# Patient Record
Sex: FEMALE | Race: BLACK OR AFRICAN AMERICAN | Employment: FULL TIME | URBAN - METROPOLITAN AREA
[De-identification: names, ages, dates, MRNs, and addresses within clinical notes are randomized per-mention and may not be internally consistent; named-entity substitution may affect disease eponyms.]

---

## 2017-01-04 PROBLEM — Z87.898 HISTORY OF PREDIABETES: Status: ACTIVE | Noted: 2017-01-04

## 2017-01-04 PROBLEM — N80.9 ENDOMETRIOSIS: Status: ACTIVE | Noted: 2017-01-04

## 2017-03-24 PROBLEM — R10.10 PAIN OF UPPER ABDOMEN: Status: ACTIVE | Noted: 2017-03-24

## 2017-03-28 ENCOUNTER — HOSPITAL ENCOUNTER (OUTPATIENT)
Dept: ULTRASOUND IMAGING | Age: 37
Discharge: HOME | End: 2017-03-28
Attending: FAMILY MEDICINE
Payer: SELF-PAY

## 2017-03-28 DIAGNOSIS — R74.01 TRANSAMINITIS: ICD-10-CM

## 2017-03-28 PROBLEM — K76.0 HEPATIC STEATOSIS: Status: ACTIVE | Noted: 2017-03-28

## 2017-03-28 PROCEDURE — 76700 US EXAM ABDOM COMPLETE: CPT

## 2017-03-28 NOTE — PROGRESS NOTES
As suspected, she has a fatty liver. This can eventually lead to liver failure. The best way to avoid that is to cut back on alcohol. We will have to monitor her labs a few times a year going forward.

## 2017-09-20 ENCOUNTER — APPOINTMENT (OUTPATIENT)
Dept: GENERAL RADIOLOGY | Age: 37
End: 2017-09-20
Attending: EMERGENCY MEDICINE
Payer: SELF-PAY

## 2017-09-20 ENCOUNTER — HOSPITAL ENCOUNTER (EMERGENCY)
Age: 37
Discharge: HOME | End: 2017-09-20
Attending: EMERGENCY MEDICINE
Payer: SELF-PAY

## 2017-09-20 ENCOUNTER — APPOINTMENT (OUTPATIENT)
Dept: GENERAL RADIOLOGY | Age: 37
End: 2017-09-20
Payer: SELF-PAY

## 2017-09-20 VITALS
DIASTOLIC BLOOD PRESSURE: 88 MMHG | OXYGEN SATURATION: 99 % | WEIGHT: 275 LBS | TEMPERATURE: 97.3 F | RESPIRATION RATE: 17 BRPM | HEIGHT: 68 IN | SYSTOLIC BLOOD PRESSURE: 167 MMHG | BODY MASS INDEX: 41.68 KG/M2 | HEART RATE: 82 BPM

## 2017-09-20 DIAGNOSIS — S93.402A SPRAIN OF LEFT ANKLE, UNSPECIFIED LIGAMENT, INITIAL ENCOUNTER: Primary | ICD-10-CM

## 2017-09-20 PROCEDURE — 99284 EMERGENCY DEPT VISIT MOD MDM: CPT | Performed by: EMERGENCY MEDICINE

## 2017-09-20 PROCEDURE — 73610 X-RAY EXAM OF ANKLE: CPT

## 2017-09-20 PROCEDURE — 73590 X-RAY EXAM OF LOWER LEG: CPT

## 2017-09-20 RX ORDER — MELOXICAM 15 MG/1
15 TABLET ORAL DAILY
Qty: 10 TAB | Refills: 0 | Status: SHIPPED | OUTPATIENT
Start: 2017-09-20 | End: 2017-09-22

## 2017-09-20 RX ORDER — HYDROCODONE BITARTRATE AND ACETAMINOPHEN 5; 325 MG/1; MG/1
1 TABLET ORAL
Status: DISCONTINUED | OUTPATIENT
Start: 2017-09-20 | End: 2017-09-20 | Stop reason: HOSPADM

## 2017-09-20 NOTE — ED NOTES
I have reviewed discharge instructions with the patient. The patient verbalized understanding. Patient left ED via Discharge Method: ambulatory to Home with family. Opportunity for questions and clarification provided. Patient given 1 scripts.

## 2017-09-20 NOTE — DISCHARGE INSTRUCTIONS
Ankle Sprain: Care Instructions  Your Care Instructions    An ankle sprain can happen when you twist your ankle. The ligaments that support the ankle can get stretched and torn. Often the ankle is swollen and painful. Ankle sprains may take from several weeks to several months to heal. Usually, the more pain and swelling you have, the more severe your ankle sprain is and the longer it will take to heal. You can heal faster and regain strength in your ankle with good home treatment. It is very important to give your ankle time to heal completely, so that you do not easily hurt your ankle again. Follow-up care is a key part of your treatment and safety. Be sure to make and go to all appointments, and call your doctor if you are having problems. It's also a good idea to know your test results and keep a list of the medicines you take. How can you care for yourself at home? · Prop up your foot on pillows as much as possible for the next 3 days. Try to keep your ankle above the level of your heart. This will help reduce the swelling. · Follow your doctor's directions for wearing a splint or elastic bandage. Wrapping the ankle may help reduce or prevent swelling. · Your doctor may give you a splint, a brace, an air stirrup, or another form of ankle support to protect your ankle until it is healed. Wear it as directed while your ankle is healing. Do not remove it unless your doctor tells you to. After your ankle has healed, ask your doctor whether you should wear the brace when you exercise. · Put ice or cold packs on your injured ankle for 10 to 20 minutes at a time. Try to do this every 1 to 2 hours for the next 3 days (when you are awake) or until the swelling goes down. Put a thin cloth between the ice and your skin. · You may need to use crutches until you can walk without pain. If you do use crutches, try to bear some weight on your injured ankle if you can do so without pain.  This helps the ankle heal.  · Take pain medicines exactly as directed. ¨ If the doctor gave you a prescription medicine for pain, take it as prescribed. ¨ If you are not taking a prescription pain medicine, ask your doctor if you can take an over-the-counter medicine. · If you have been given ankle exercises to do at home, do them exactly as instructed. These can promote healing and help prevent lasting weakness. When should you call for help? Call your doctor now or seek immediate medical care if:  · Your pain is getting worse. · Your swelling is getting worse. · Your splint feels too tight or you are unable to loosen it. Watch closely for changes in your health, and be sure to contact your doctor if:  · You are not getting better after 1 week. Where can you learn more? Go to http://edel-john.info/. Enter L977 in the search box to learn more about \"Ankle Sprain: Care Instructions. \"  Current as of: March 21, 2017  Content Version: 11.3  © 9633-2046 [employer]. Care instructions adapted under license by [employer] (which disclaims liability or warranty for this information). If you have questions about a medical condition or this instruction, always ask your healthcare professional. [place] [address] any warranty or liability for your use of this information. Ankle Sprain: Rehab Exercises  Your Care Instructions  Here are some examples of typical rehabilitation exercises for your condition. Start each exercise slowly. Ease off the exercise if you start to have pain. Your doctor or physical therapist will tell you when you can start these exercises and which ones will work best for you. How to do the exercises  \"Alphabet\" exercise    1. Trace the alphabet with your toe. This helps your ankle move in all directions. Side-to-side knee swing exercise    1. Sit in a chair with your foot flat on the floor. 2. Slowly move your knee from side to side.  Keep your foot pressed flat. 3. Continue this exercise for 2 to 3 minutes. Towel curl    1. While sitting, place your foot on a towel on the floor. Scrunch the towel toward you with your toes. 2. Then use your toes to push the towel away from you. 3. To make this exercise more challenging you can put something on the other end of the towel. A can of soup is about the right weight for this. Towel stretch    1. Sit with your legs extended and knees straight. 2. Place a towel around your foot just under the toes. 3. Hold each end of the towel in each hand, with your hands above your knees. 4. Pull back with the towel so that your foot stretches toward you. 5. Hold the position for at least 15 to 30 seconds. 6. Repeat 2 to 4 times a session. Do up to 5 sessions a day. Ankle eversion exercise    1. Start by sitting with your foot flat on the floor. Push your foot outward against a wall or a piece of furniture that doesn't move. Hold for about 6 seconds, and relax. Repeat 8 to 12 times. 2. After you feel comfortable with this, try using rubber tubing looped around the outside of your feet for resistance. Push your foot out to the side against the tubing, and then count to 10 as you slowly bring your foot back to the middle. Repeat 8 to 12 times. Isometric opposition exercises    1. While sitting, put your feet together flat on the floor. 2. Press your injured foot inward against your other foot. Hold for about 6 seconds, and relax. Repeat 8 to 12 times. 3. Then place the heel of your other foot on top of the injured one. Push down with the top heel while trying to push up with your injured foot. Hold for about 6 seconds, and relax. Repeat 8 to 12 times. Resisted ankle inversion    1. Sit on the floor with your good leg crossed over your other leg. 2. Hold both ends of an exercise band and loop the band around the inside of your affected foot. Then press your other foot against the band.   3. Keeping your legs crossed, slowly push your affected foot against the band so that foot moves away from your other foot. Then slowly relax. 4. Repeat 8 to 12 times. Resisted ankle eversion    1. Sit on the floor with your legs straight. 2. Hold both ends of an exercise band and loop the band around the outside of your affected foot. Then press your other foot against the band. 3. Keeping your leg straight, slowly push your affected foot outward against the band and away from your other foot without letting your leg rotate. Then slowly relax. 4. Repeat 8 to 12 times. Resisted ankle dorsiflexion    1. Tie the ends of an exercise band together to form a loop. Attach one end of the loop to a secure object or shut a door on it to hold it in place. (Or you can have someone hold one end of the loop to provide resistance.)  2. While sitting on the floor or in a chair, loop the other end of the band over the top of your affected foot. 3. Keeping your knee and leg straight, slowly flex your foot to pull back on the exercise band, and then slowly relax. 4. Repeat 8 to 12 times. Single-leg balance    1. Stand on a flat surface with your arms stretched out to your sides like you are making the letter \"T. \" Then lift your good leg off the floor, bending it at the knee. If you are not steady on your feet, use one hand to hold on to a chair, counter, or wall. 2. Standing on the leg with your affected ankle, keep that knee straight. Try to balance on that leg for up to 30 seconds. Then rest for up to 10 seconds. 3. Repeat 6 to 8 times. 4. When you can balance on your affected leg for 30 seconds with your eyes open, try to balance on it with your eyes closed. When you can do this exercise with your eyes closed for 30 seconds and with ease and no pain, try standing on a pillow or piece of foam, and repeat steps 1 through 4. Follow-up care is a key part of your treatment and safety.  Be sure to make and go to all appointments, and call your doctor if you are having problems. It's also a good idea to know your test results and keep a list of the medicines you take. Where can you learn more? Go to http://edel-john.info/. Drake Salinas in the search box to learn more about \"Ankle Sprain: Rehab Exercises. \"  Current as of: March 21, 2017  Content Version: 11.3  © 1033-8167 [employer]. Care instructions adapted under license by [employer] (which disclaims liability or warranty for this information). If you have questions about a medical condition or this instruction, always ask your healthcare professional. [address] any warranty or liability for your use of this information.

## 2017-09-20 NOTE — ED PROVIDER NOTES
HPI Comments: Presents with complaints of left ankle pain  After slipping and falling. States she felt a pop. States it hurts all the way up her lateral leg. Patient is a 44 y.o. female presenting with ankle problem. The history is provided by the patient. Ankle Injury    This is a new problem. The current episode started 1 to 2 hours ago. The problem occurs constantly. The problem has not changed since onset. The pain is present in the left ankle and left lower leg. The quality of the pain is described as aching and pounding. The pain is moderate. Associated symptoms include stiffness. She has tried nothing for the symptoms. There has been a history of trauma. Past Medical History:   Diagnosis Date    Gastrointestinal disorder     reflux    GERD (gastroesophageal reflux disease)     Other ill-defined conditions(799.89)     ruptured back disc; endometriosis       Past Surgical History:   Procedure Laterality Date    HX COLONOSCOPY  2007    Normal - workup for anemia    HX HYSTERECTOMY      L ovary also taken out - was adhered to bladder 2007 2/2 endometriosis         Family History:   Problem Relation Age of Onset    Heart Disease Father     Diabetes Mother        Social History     Social History    Marital status: SINGLE     Spouse name: N/A    Number of children: N/A    Years of education: N/A     Occupational History    Not on file. Social History Main Topics    Smoking status: Never Smoker    Smokeless tobacco: Not on file    Alcohol use Yes      Comment: occ    Drug use: No    Sexual activity: Not Currently     Other Topics Concern    Not on file     Social History Narrative         ALLERGIES: Effexor [venlafaxine]    Review of Systems   Constitutional: Negative for chills and fever. Musculoskeletal: Positive for joint swelling and stiffness. Skin: Negative for rash and wound. All other systems reviewed and are negative.       Vitals:    09/20/17 1238 09/20/17 1624   BP: (!) 180/103 167/88   Pulse: 98 82   Resp: 18 17   Temp: 97.6 °F (36.4 °C) 97.3 °F (36.3 °C)   SpO2: 99%    Weight: 124.7 kg (275 lb)    Height: 5' 8\" (1.727 m)             Physical Exam   Constitutional: She is oriented to person, place, and time. She appears well-developed and well-nourished. No distress. HENT:   Head: Normocephalic and atraumatic. Neck: Normal range of motion. Neck supple. Musculoskeletal: She exhibits edema and tenderness. She exhibits no deformity. Left ankle: She exhibits decreased range of motion and swelling. She exhibits normal pulse. Tenderness. Lateral malleolus and proximal fibula tenderness found. No medial malleolus and no head of 5th metatarsal tenderness found. Achilles tendon normal. Achilles tendon exhibits no pain. Neurological: She is alert and oriented to person, place, and time. Skin: Skin is warm and dry. She is not diaphoretic. Nursing note and vitals reviewed. MDM  Number of Diagnoses or Management Options  Sprain of left ankle, unspecified ligament, initial encounter:   Diagnosis management comments: Aircast crutches and told to follow-up with her PCP, home with [facility] given Norco here.        Amount and/or Complexity of Data Reviewed  Tests in the radiology section of CPT®: ordered and reviewed    Risk of Complications, Morbidity, and/or Mortality  Presenting problems: moderate  Diagnostic procedures: moderate  Management options: moderate    Patient Progress  Patient progress: stable    ED Course       Procedures

## 2017-09-20 NOTE — LETTER
[facility] [place] [facility] [facility] [address] [address] [phone] Work/School Note Date: 9/20/2017 To Whom It May concern: 
 
Sammi Moy was seen and treated today in the emergency room by the following provider(s): 
Attending Provider: Radha Santiago MD. Sammi Moy may return to work on 9/23/2017 or sooner if feeling better. Sincerely, Jannette Lilly

## 2017-09-20 NOTE — ED TRIAGE NOTES
Pt. States she slipped on pine needles today and felt her left ankle pop. Pulse, motor, and sensory intact to affected foot.

## 2017-09-22 PROBLEM — S93.402A SPRAIN OF ANKLE, LEFT: Status: ACTIVE | Noted: 2017-09-22

## 2017-12-12 PROBLEM — J01.10 ACUTE FRONTAL SINUSITIS: Status: ACTIVE | Noted: 2017-12-12

## 2017-12-12 PROBLEM — F51.01 PRIMARY INSOMNIA: Status: ACTIVE | Noted: 2017-12-12

## 2017-12-12 PROBLEM — H72.91 PERFORATION OF RIGHT TYMPANIC MEMBRANE: Status: ACTIVE | Noted: 2017-12-12

## 2018-09-06 PROBLEM — D48.9 NEOPLASM, UNCERTAIN WHETHER BENIGN OR MALIGNANT: Status: ACTIVE | Noted: 2018-09-06

## 2018-09-06 PROBLEM — J01.10 ACUTE FRONTAL SINUSITIS: Status: RESOLVED | Noted: 2017-12-12 | Resolved: 2018-09-06

## 2019-02-12 ENCOUNTER — APPOINTMENT (OUTPATIENT)
Dept: ULTRASOUND IMAGING | Age: 39
End: 2019-02-12
Attending: EMERGENCY MEDICINE
Payer: COMMERCIAL

## 2019-02-12 ENCOUNTER — HOSPITAL ENCOUNTER (EMERGENCY)
Age: 39
Discharge: HOME | End: 2019-02-12
Attending: EMERGENCY MEDICINE
Payer: COMMERCIAL

## 2019-02-12 VITALS
SYSTOLIC BLOOD PRESSURE: 148 MMHG | RESPIRATION RATE: 18 BRPM | DIASTOLIC BLOOD PRESSURE: 89 MMHG | OXYGEN SATURATION: 96 % | TEMPERATURE: 98.9 F | HEIGHT: 68 IN | BODY MASS INDEX: 41.52 KG/M2 | WEIGHT: 274 LBS | HEART RATE: 74 BPM

## 2019-02-12 DIAGNOSIS — K76.0 FATTY INFILTRATION OF LIVER: ICD-10-CM

## 2019-02-12 DIAGNOSIS — R10.11 ABDOMINAL PAIN, RIGHT UPPER QUADRANT: Primary | ICD-10-CM

## 2019-02-12 LAB
ALBUMIN SERPL-MCNC: 4.2 G/DL (ref 3.5–5)
ALBUMIN/GLOB SERPL: 1.1 {RATIO} (ref 1.2–3.5)
ALP SERPL-CCNC: 65 U/L (ref 50–136)
ALT SERPL-CCNC: 35 U/L (ref 12–65)
ANION GAP SERPL CALC-SCNC: 8 MMOL/L (ref 7–16)
AST SERPL-CCNC: 22 U/L (ref 15–37)
BASOPHILS # BLD: 0 K/UL (ref 0–0.2)
BASOPHILS NFR BLD: 0 % (ref 0–2)
BILIRUB SERPL-MCNC: 0.6 MG/DL (ref 0.2–1.1)
BUN SERPL-MCNC: 12 MG/DL (ref 6–23)
CALCIUM SERPL-MCNC: 9.3 MG/DL (ref 8.3–10.4)
CHLORIDE SERPL-SCNC: 105 MMOL/L (ref 98–107)
CO2 SERPL-SCNC: 26 MMOL/L (ref 21–32)
CREAT SERPL-MCNC: 0.93 MG/DL (ref 0.6–1)
DIFFERENTIAL METHOD BLD: NORMAL
EOSINOPHIL # BLD: 0.1 K/UL (ref 0–0.8)
EOSINOPHIL NFR BLD: 2 % (ref 0.5–7.8)
ERYTHROCYTE [DISTWIDTH] IN BLOOD BY AUTOMATED COUNT: 13.4 % (ref 11.9–14.6)
GLOBULIN SER CALC-MCNC: 3.7 G/DL (ref 2.3–3.5)
GLUCOSE SERPL-MCNC: 97 MG/DL (ref 65–100)
HCG UR QL: NEGATIVE
HCT VFR BLD AUTO: 42.3 % (ref 35.8–46.3)
HGB BLD-MCNC: 13.9 G/DL (ref 11.7–15.4)
IMM GRANULOCYTES # BLD AUTO: 0 K/UL (ref 0–0.5)
IMM GRANULOCYTES NFR BLD AUTO: 0 % (ref 0–5)
LIPASE SERPL-CCNC: 194 U/L (ref 73–393)
LYMPHOCYTES # BLD: 2.4 K/UL (ref 0.5–4.6)
LYMPHOCYTES NFR BLD: 34 % (ref 13–44)
MCH RBC QN AUTO: 31.2 PG (ref 26.1–32.9)
MCHC RBC AUTO-ENTMCNC: 32.9 G/DL (ref 31.4–35)
MCV RBC AUTO: 94.8 FL (ref 79.6–97.8)
MONOCYTES # BLD: 0.6 K/UL (ref 0.1–1.3)
MONOCYTES NFR BLD: 9 % (ref 4–12)
NEUTS SEG # BLD: 3.9 K/UL (ref 1.7–8.2)
NEUTS SEG NFR BLD: 55 % (ref 43–78)
NRBC # BLD: 0 K/UL (ref 0–0.2)
PLATELET # BLD AUTO: 302 K/UL (ref 150–450)
PMV BLD AUTO: 9.5 FL (ref 9.4–12.3)
POTASSIUM SERPL-SCNC: 4 MMOL/L (ref 3.5–5.1)
PROT SERPL-MCNC: 7.9 G/DL (ref 6.3–8.2)
RBC # BLD AUTO: 4.46 M/UL (ref 4.05–5.2)
SODIUM SERPL-SCNC: 139 MMOL/L (ref 136–145)
WBC # BLD AUTO: 7 K/UL (ref 4.3–11.1)

## 2019-02-12 PROCEDURE — 76705 ECHO EXAM OF ABDOMEN: CPT

## 2019-02-12 PROCEDURE — 81003 URINALYSIS AUTO W/O SCOPE: CPT | Performed by: EMERGENCY MEDICINE

## 2019-02-12 PROCEDURE — 99284 EMERGENCY DEPT VISIT MOD MDM: CPT | Performed by: EMERGENCY MEDICINE

## 2019-02-12 PROCEDURE — 80053 COMPREHEN METABOLIC PANEL: CPT

## 2019-02-12 PROCEDURE — 81025 URINE PREGNANCY TEST: CPT

## 2019-02-12 PROCEDURE — 83690 ASSAY OF LIPASE: CPT

## 2019-02-12 PROCEDURE — 85025 COMPLETE CBC W/AUTO DIFF WBC: CPT

## 2019-02-12 RX ORDER — PROMETHAZINE HYDROCHLORIDE 25 MG/1
25 TABLET ORAL
Qty: 12 TAB | Refills: 0 | Status: SHIPPED | OUTPATIENT
Start: 2019-02-12 | End: 2019-02-27

## 2019-02-12 RX ORDER — SUCRALFATE 1 G/1
1 TABLET ORAL 4 TIMES DAILY
Qty: 30 TAB | Refills: 0 | Status: SHIPPED | OUTPATIENT
Start: 2019-02-12 | End: 2019-02-27 | Stop reason: SDUPTHER

## 2019-02-12 NOTE — LETTER
NOTIFICATION RETURN TO WORK / SCHOOL 
 
2/12/2019 9:59 PM 
 
Ms. Marcela Ann [address] [address] To Whom It May Concern: 
 
Marcela Ann is currently under the care of [facility] EMERGENCY DEPT. She will return to work/school on: 2/13/19 If there are questions or concerns please have the patient contact our office. Sincerely, Rosana Canchola MD

## 2019-02-12 NOTE — ED NOTES
Patient to ED via POV. Patient reports ABD pain, onset last night. Patient reports pain as to the R flank with some radiation into R side low ABD. Patient reports some diaphoresis at onset. Patient with hx of fatty liver. Patient reports some nausea w/o vomiting. Patient denies any urinary symptoms.

## 2019-02-13 NOTE — ED NOTES
I have reviewed discharge instructions with the patient. The patient verbalized understanding. Patient left ED via Discharge Method: ambulatory to Home with family. Opportunity for questions and clarification provided. Patient given 2 scripts. To continue your aftercare when you leave the hospital, you may receive an automated call from our care team to check in on how you are doing. This is a free service and part of our promise to provide the best care and service to meet your aftercare needs.  If you have questions, or wish to unsubscribe from this service please call [phone]. Thank you for Choosing our [facility] Emergency Department.

## 2019-02-13 NOTE — DISCHARGE INSTRUCTIONS
Take medications as prescribed  Follow-up with your primary care physician  Return to the ER for any new or worsening symptoms    Abdominal Pain: Care Instructions  Your Care Instructions    Abdominal pain has many possible causes. Some aren't serious and get better on their own in a few days. Others need more testing and treatment. If your pain continues or gets worse, you need to be rechecked and may need more tests to find out what is wrong. You may need surgery to correct the problem. Don't ignore new symptoms, such as fever, nausea and vomiting, urination problems, pain that gets worse, and dizziness. These may be signs of a more serious problem. Your doctor may have recommended a follow-up visit in the next 8 to 12 hours. If you are not getting better, you may need more tests or treatment. The doctor has checked you carefully, but problems can develop later. If you notice any problems or new symptoms, get medical treatment right away. Follow-up care is a key part of your treatment and safety. Be sure to make and go to all appointments, and call your doctor if you are having problems. It's also a good idea to know your test results and keep a list of the medicines you take. How can you care for yourself at home? · Rest until you feel better. · To prevent dehydration, drink plenty of fluids, enough so that your urine is light yellow or clear like water. Choose water and other caffeine-free clear liquids until you feel better. If you have kidney, heart, or liver disease and have to limit fluids, talk with your doctor before you increase the amount of fluids you drink. · If your stomach is upset, eat mild foods, such as rice, dry toast or crackers, bananas, and applesauce. Try eating several small meals instead of two or three large ones. · Wait until 48 hours after all symptoms have gone away before you have spicy foods, alcohol, and drinks that contain caffeine.   · Do not eat foods that are high in fat.  · Avoid anti-inflammatory medicines such as aspirin, ibuprofen (Advil, Motrin), and naproxen (Aleve). These can cause stomach upset. Talk to your doctor if you take daily aspirin for another health problem. When should you call for help? Call 911 anytime you think you may need emergency care. For example, call if:    · You passed out (lost consciousness).     · You pass maroon or very bloody stools.     · You vomit blood or what looks like coffee grounds.     · You have new, severe belly pain.    Call your doctor now or seek immediate medical care if:    · Your pain gets worse, especially if it becomes focused in one area of your belly.     · You have a new or higher fever.     · Your stools are black and look like tar, or they have streaks of blood.     · You have unexpected vaginal bleeding.     · You have symptoms of a urinary tract infection. These may include:  ? Pain when you urinate. ? Urinating more often than usual.  ? Blood in your urine.     · You are dizzy or lightheaded, or you feel like you may faint.    Watch closely for changes in your health, and be sure to contact your doctor if:    · You are not getting better after 1 day (24 hours). Where can you learn more? Go to http://edel-john.info/. Enter M928 in the search box to learn more about \"Abdominal Pain: Care Instructions. \"  Current as of: September 23, 2018  Content Version: 11.9  © 1844-3988 [employer]. Care instructions adapted under license by [employer] (which disclaims liability or warranty for this information). If you have questions about a medical condition or this instruction, always ask your healthcare professional. [place] [address] any warranty or liability for your use of this information.          Patient Education        Nonalcoholic Steatohepatitis (CRAWFORD): Care Instructions  Your Care Instructions    Nonalcoholic steatohepatitis (CRAWFORD) is liver inflammation. It is caused by a buildup of fat in the liver. The fat buildup is not caused by drinking alcohol. Because of the inflammation, the liver does not work as well as it should. CRAWFORD is part of a group of liver diseases called nonalcoholic fatty liver disease. In these diseases, fat builds up in the liver and sometimes causes liver damage. This damage can get worse over time. Follow-up care is a key part of your treatment and safety. Be sure to make and go to all appointments, and call your doctor if you are having problems. It's also a good idea to know your test results and keep a list of the medicines you take. How can you care for yourself at home? · Stay at a healthy weight. Or if you need to, slowly get to a healthy weight. · Control your cholesterol. Talk to your doctor about ways to lower your cholesterol, if needed. You might try getting active, taking medicines, and making healthy changes to your diet. · Eat healthy foods. This includes fruits, vegetables, lean meats and dairy, and whole grains. · If you have diabetes, keep your blood sugar at your target level. · Get at least 30 minutes of exercise on most days of the week. Walking is a good choice. You also may want to do other activities, such as running, swimming, cycling, or playing tennis or team sports. · Limit alcohol, or do not drink. Alcohol can damage the liver and cause health problems. When should you call for help? Call 911 anytime you think you may need emergency care. For example, call if:    · You have trouble breathing.     · You vomit blood or what looks like coffee grounds.    Call your doctor now or seek immediate medical care if:    · You feel very sleepy or confused.     · You have new or worse belly pain.     · You have a fever.     · There is a new or increasing yellow tint to your skin or the whites of your eyes.     · You have any abnormal bleeding, such as:  ? Nosebleeds.   ? Vaginal bleeding that is different (heavier, more frequent, at a different time of the month) than what you are used to.  ? Bloody or black stools, or rectal bleeding. ? Bloody or pink urine.    Watch closely for changes in your health, and be sure to contact your doctor if:    · Your belly is getting bigger.     · You are gaining weight.     · You have any problems. Where can you learn more? Go to http://edel-john.info/. Enter P407 in the search box to learn more about \"Nonalcoholic Steatohepatitis (CRAWFORD): Care Instructions. \"  Current as of: March 27, 2018  Content Version: 11.9  © 9307-3834 [employer]. Care instructions adapted under license by [employer] (which disclaims liability or warranty for this information). If you have questions about a medical condition or this instruction, always ask your healthcare professional. [address] any warranty or liability for your use of this information.

## 2019-02-13 NOTE — ED PROVIDER NOTES
Patient is a ER complaining of right flank pain. Reports symptoms started last night at work. Reports pain radiates to right upper quadrant. There is report some nausea but denies any vomiting. She denies any urinary symptoms. Denies any fevers or chills The history is provided by the patient. Abdominal Pain This is a new problem. The current episode started yesterday. The problem occurs constantly. The pain is located in the RUQ. The quality of the pain is aching. The pain is at a severity of 5/10. The pain is moderate. Associated symptoms include nausea. Pertinent negatives include no hematochezia, no vomiting, no constipation, no frequency, no hematuria and no back pain. The pain is relieved by nothing. Past Medical History:  
Diagnosis Date  Gastrointestinal disorder   
 reflux  GERD (gastroesophageal reflux disease)  High cholesterol  Other ill-defined conditions(799.89) ruptured back disc; endometriosis Past Surgical History:  
Procedure Laterality Date  HX COLONOSCOPY  2007 Normal - workup for anemia  HX CYST REMOVAL Left 1996 Breast cyst removal  
 HX HYSTERECTOMY L ovary also taken out - was adhered to bladder 2007 2/2 endometriosis Family History:  
Problem Relation Age of Onset  Heart Disease Father  Diabetes Mother  Kidney Disease Mother Social History Socioeconomic History  Marital status: SINGLE Spouse name: Not on file  Number of children: Not on file  Years of education: Not on file  Highest education level: Not on file Social Needs  Financial resource strain: Not on file  Food insecurity - worry: Not on file  Food insecurity - inability: Not on file  Transportation needs - medical: Not on file  Transportation needs - non-medical: Not on file Occupational History  Not on file Tobacco Use  Smoking status: Never Smoker  Smokeless tobacco: Never Used Substance and Sexual Activity  Alcohol use: Yes Comment: occ  Drug use: No  
 Sexual activity: Not Currently Other Topics Concern  Not on file Social History Narrative  Not on file ALLERGIES: Amlodipine; Dexilant [dexlansoprazole]; and Effexor [venlafaxine] Review of Systems Constitutional: Negative for diaphoresis, fatigue and unexpected weight change. HENT: Negative for congestion. Eyes: Negative for photophobia, redness and visual disturbance. Respiratory: Negative for chest tightness. Cardiovascular: Negative for palpitations and leg swelling. Gastrointestinal: Positive for abdominal pain and nausea. Negative for constipation, hematochezia and vomiting. Endocrine: Negative for polydipsia, polyphagia and polyuria. Genitourinary: Negative for flank pain, frequency, hematuria and urgency. Musculoskeletal: Negative for back pain and gait problem. Allergic/Immunologic: Negative for immunocompromised state. Neurological: Negative for light-headedness and numbness. Hematological: Negative for adenopathy. Does not bruise/bleed easily. Psychiatric/Behavioral: Negative for behavioral problems and confusion. All other systems reviewed and are negative. Vitals:  
 02/12/19 1830 BP: (!) 158/99 Pulse: 74 Resp: 18 Temp: 98.9 °F (37.2 °C) SpO2: 99% Weight: 124.3 kg (274 lb) Height: 5' 8\" (1.727 m) Physical Exam  
Constitutional: She is oriented to person, place, and time. She appears well-developed and well-nourished. HENT:  
Head: Normocephalic and atraumatic. Eyes: EOM are normal. Pupils are equal, round, and reactive to light. Neck: Normal range of motion. Neck supple. No thyromegaly present. Cardiovascular: Normal rate, regular rhythm and normal heart sounds. Pulmonary/Chest: Effort normal and breath sounds normal. No stridor. No respiratory distress. Abdominal: Normal appearance and normal aorta. There is tenderness in the epigastric area. Musculoskeletal: Normal range of motion. She exhibits no edema or deformity. Neurological: She is alert and oriented to person, place, and time. No cranial nerve deficit. Coordination normal.  
Nursing note and vitals reviewed. MDM Number of Diagnoses or Management Options Diagnosis management comments: Differential diagnoses includes gastritis, biliary colic, pancreatitis 9:52 PM 
Normal basic labs and urinalysis. Did obtain ultrasound right upper quadrant shows fatty liver disease. No gallstones or signs of biliary obstruction or cholecystitis Discussed the patient was also testing. We'll discharge home. Encouraged close follow-up with primary care physician Amount and/or Complexity of Data Reviewed Clinical lab tests: ordered and reviewed Tests in the radiology section of CPT®: ordered and reviewed Risk of Complications, Morbidity, and/or Mortality Presenting problems: moderate Diagnostic procedures: low Management options: low Patient Progress Patient progress: stable Procedures Results Include: 
 
Recent Results (from the past 24 hour(s)) CBC WITH AUTOMATED DIFF Collection Time: 02/12/19  6:33 PM  
Result Value Ref Range WBC 7.0 4.3 - 11.1 K/uL  
 RBC 4.46 4.05 - 5.2 M/uL  
 HGB 13.9 11.7 - 15.4 g/dL HCT 42.3 35.8 - 46.3 % MCV 94.8 79.6 - 97.8 FL  
 MCH 31.2 26.1 - 32.9 PG  
 MCHC 32.9 31.4 - 35.0 g/dL  
 RDW 13.4 11.9 - 14.6 % PLATELET 560 793 - 568 K/uL MPV 9.5 9.4 - 12.3 FL ABSOLUTE NRBC 0.00 0.0 - 0.2 K/uL  
 DF AUTOMATED NEUTROPHILS 55 43 - 78 % LYMPHOCYTES 34 13 - 44 % MONOCYTES 9 4.0 - 12.0 % EOSINOPHILS 2 0.5 - 7.8 % BASOPHILS 0 0.0 - 2.0 % IMMATURE GRANULOCYTES 0 0.0 - 5.0 %  
 ABS. NEUTROPHILS 3.9 1.7 - 8.2 K/UL  
 ABS. LYMPHOCYTES 2.4 0.5 - 4.6 K/UL  
 ABS. MONOCYTES 0.6 0.1 - 1.3 K/UL ABS. EOSINOPHILS 0.1 0.0 - 0.8 K/UL  
 ABS. BASOPHILS 0.0 0.0 - 0.2 K/UL  
 ABS. IMM. GRANS. 0.0 0.0 - 0.5 K/UL METABOLIC PANEL, COMPREHENSIVE Collection Time: 02/12/19  6:33 PM  
Result Value Ref Range Sodium 139 136 - 145 mmol/L Potassium 4.0 3.5 - 5.1 mmol/L Chloride 105 98 - 107 mmol/L  
 CO2 26 21 - 32 mmol/L Anion gap 8 7 - 16 mmol/L Glucose 97 65 - 100 mg/dL BUN 12 6 - 23 MG/DL Creatinine 0.93 0.6 - 1.0 MG/DL  
 GFR est AA >60 >60 ml/min/1.73m2 GFR est non-AA >60 >60 ml/min/1.73m2 Calcium 9.3 8.3 - 10.4 MG/DL Bilirubin, total 0.6 0.2 - 1.1 MG/DL  
 ALT (SGPT) 35 12 - 65 U/L  
 AST (SGOT) 22 15 - 37 U/L Alk. phosphatase 65 50 - 136 U/L Protein, total 7.9 6.3 - 8.2 g/dL Albumin 4.2 3.5 - 5.0 g/dL Globulin 3.7 (H) 2.3 - 3.5 g/dL A-G Ratio 1.1 (L) 1.2 - 3.5 LIPASE Collection Time: 02/12/19  6:33 PM  
Result Value Ref Range Lipase 194 73 - 393 U/L  
HCG URINE, QL. - POC Collection Time: 02/12/19  7:30 PM  
Result Value Ref Range Pregnancy test,urine (POC) NEGATIVE  NEG Voice dictation software was used during the making of this note. This software is not perfect and grammatical and other typographical errors may be present. This note has been proofread, but may still contain errors.  
Jen Rueda MD; 2/12/2019 @9:52 PM  
===================================================================

## 2019-02-27 PROBLEM — F10.280 ALCOHOL DEPENDENCE WITH ALCOHOL-INDUCED ANXIETY DISORDER (HCC): Status: ACTIVE | Noted: 2019-02-27

## 2019-09-23 ENCOUNTER — APPOINTMENT (OUTPATIENT)
Dept: CT IMAGING | Age: 39
End: 2019-09-23
Attending: EMERGENCY MEDICINE
Payer: COMMERCIAL

## 2019-09-23 ENCOUNTER — HOSPITAL ENCOUNTER (EMERGENCY)
Age: 39
Discharge: HOME | End: 2019-09-24
Attending: EMERGENCY MEDICINE
Payer: COMMERCIAL

## 2019-09-23 ENCOUNTER — APPOINTMENT (OUTPATIENT)
Dept: GENERAL RADIOLOGY | Age: 39
End: 2019-09-23
Attending: EMERGENCY MEDICINE
Payer: COMMERCIAL

## 2019-09-23 VITALS
TEMPERATURE: 99 F | WEIGHT: 266 LBS | RESPIRATION RATE: 21 BRPM | HEIGHT: 68 IN | OXYGEN SATURATION: 94 % | DIASTOLIC BLOOD PRESSURE: 90 MMHG | SYSTOLIC BLOOD PRESSURE: 158 MMHG | BODY MASS INDEX: 40.32 KG/M2 | HEART RATE: 110 BPM

## 2019-09-23 DIAGNOSIS — R07.89 CHEST WALL PAIN: Primary | ICD-10-CM

## 2019-09-23 LAB
ALBUMIN SERPL-MCNC: 3.6 G/DL (ref 3.5–5)
ALBUMIN/GLOB SERPL: 1 {RATIO} (ref 1.2–3.5)
ALP SERPL-CCNC: 53 U/L (ref 50–136)
ALT SERPL-CCNC: 75 U/L (ref 12–65)
ANION GAP SERPL CALC-SCNC: 7 MMOL/L (ref 7–16)
AST SERPL-CCNC: 55 U/L (ref 15–37)
BASOPHILS # BLD: 0 K/UL (ref 0–0.2)
BASOPHILS NFR BLD: 0 % (ref 0–2)
BILIRUB SERPL-MCNC: 0.3 MG/DL (ref 0.2–1.1)
BUN SERPL-MCNC: 9 MG/DL (ref 6–23)
CALCIUM SERPL-MCNC: 9.4 MG/DL (ref 8.3–10.4)
CHLORIDE SERPL-SCNC: 107 MMOL/L (ref 98–107)
CO2 SERPL-SCNC: 24 MMOL/L (ref 21–32)
CREAT SERPL-MCNC: 0.58 MG/DL (ref 0.6–1)
DIFFERENTIAL METHOD BLD: ABNORMAL
EOSINOPHIL # BLD: 0.1 K/UL (ref 0–0.8)
EOSINOPHIL NFR BLD: 1 % (ref 0.5–7.8)
ERYTHROCYTE [DISTWIDTH] IN BLOOD BY AUTOMATED COUNT: 11.4 % (ref 11.9–14.6)
GLOBULIN SER CALC-MCNC: 3.6 G/DL (ref 2.3–3.5)
GLUCOSE SERPL-MCNC: 80 MG/DL (ref 65–100)
HCT VFR BLD AUTO: 39.3 % (ref 35.8–46.3)
HGB BLD-MCNC: 13 G/DL (ref 11.7–15.4)
IMM GRANULOCYTES # BLD AUTO: 0 K/UL (ref 0–0.5)
IMM GRANULOCYTES NFR BLD AUTO: 0 % (ref 0–5)
LYMPHOCYTES # BLD: 2.1 K/UL (ref 0.5–4.6)
LYMPHOCYTES NFR BLD: 28 % (ref 13–44)
MCH RBC QN AUTO: 30.2 PG (ref 26.1–32.9)
MCHC RBC AUTO-ENTMCNC: 33.1 G/DL (ref 31.4–35)
MCV RBC AUTO: 91.4 FL (ref 79.6–97.8)
MONOCYTES # BLD: 0.9 K/UL (ref 0.1–1.3)
MONOCYTES NFR BLD: 12 % (ref 4–12)
NEUTS SEG # BLD: 4.2 K/UL (ref 1.7–8.2)
NEUTS SEG NFR BLD: 58 % (ref 43–78)
NRBC # BLD: 0 K/UL (ref 0–0.2)
PLATELET # BLD AUTO: 256 K/UL (ref 150–450)
PMV BLD AUTO: 9.9 FL (ref 9.4–12.3)
POTASSIUM SERPL-SCNC: 4.1 MMOL/L (ref 3.5–5.1)
PROT SERPL-MCNC: 7.2 G/DL (ref 6.3–8.2)
RBC # BLD AUTO: 4.3 M/UL (ref 4.05–5.2)
SODIUM SERPL-SCNC: 138 MMOL/L (ref 136–145)
TROPONIN I SERPL-MCNC: 0.06 NG/ML (ref 0.02–0.05)
TROPONIN I SERPL-MCNC: 0.06 NG/ML (ref 0.02–0.05)
WBC # BLD AUTO: 7.3 K/UL (ref 4.3–11.1)

## 2019-09-23 PROCEDURE — 84484 ASSAY OF TROPONIN QUANT: CPT

## 2019-09-23 PROCEDURE — 80053 COMPREHEN METABOLIC PANEL: CPT

## 2019-09-23 PROCEDURE — 71260 CT THORAX DX C+: CPT

## 2019-09-23 PROCEDURE — 85025 COMPLETE CBC W/AUTO DIFF WBC: CPT

## 2019-09-23 PROCEDURE — 71046 X-RAY EXAM CHEST 2 VIEWS: CPT

## 2019-09-23 PROCEDURE — 99283 EMERGENCY DEPT VISIT LOW MDM: CPT | Performed by: EMERGENCY MEDICINE

## 2019-09-23 PROCEDURE — 74011000258 HC RX REV CODE- 258: Performed by: EMERGENCY MEDICINE

## 2019-09-23 PROCEDURE — 93005 ELECTROCARDIOGRAM TRACING: CPT | Performed by: EMERGENCY MEDICINE

## 2019-09-23 PROCEDURE — 74011636320 HC RX REV CODE- 636/320: Performed by: EMERGENCY MEDICINE

## 2019-09-23 RX ORDER — SODIUM CHLORIDE 0.9 % (FLUSH) 0.9 %
10 SYRINGE (ML) INJECTION
Status: COMPLETED | OUTPATIENT
Start: 2019-09-23 | End: 2019-09-23

## 2019-09-23 RX ADMIN — SODIUM CHLORIDE 100 ML: 900 INJECTION, SOLUTION INTRAVENOUS at 23:55

## 2019-09-23 RX ADMIN — Medication 10 ML: at 23:55

## 2019-09-23 RX ADMIN — IOPAMIDOL 100 ML: 755 INJECTION, SOLUTION INTRAVENOUS at 23:55

## 2019-09-23 NOTE — LETTER
[address] [facility] EMERGENCY DEPT 
[address] [facility] 
[phone] Work/School Note Date: 9/23/2019 To Whom It May concern: 
 
Annmarie Cook was seen and treated today in the emergency room by the following provider(s): 
Attending Provider: Carlton Cotton MD. Annmarie Blight {Return to school/sport/work: 9/25/2019 Sincerely, Nhi Saez MD

## 2019-09-23 NOTE — ED TRIAGE NOTES
Pt arrives to triage in wheelchair via EMS. Pt reports intermittent CP x 1 week. Pt reports pain has gotten progressively worse and is now having constant pressure that is worse with palpation and deep inspiration. Pt received 324 ASA via EMS.

## 2019-09-24 LAB
ATRIAL RATE: 105 BPM
CALCULATED P AXIS, ECG09: 23 DEGREES
CALCULATED R AXIS, ECG10: 14 DEGREES
CALCULATED T AXIS, ECG11: 23 DEGREES
DIAGNOSIS, 93000: NORMAL
P-R INTERVAL, ECG05: 136 MS
Q-T INTERVAL, ECG07: 332 MS
QRS DURATION, ECG06: 88 MS
QTC CALCULATION (BEZET), ECG08: 438 MS
VENTRICULAR RATE, ECG03: 105 BPM

## 2019-09-24 NOTE — ED PROVIDER NOTES
Patient is a 79-year-old female with history of hypertension, reflux, high cholesterol who comes to the ER [facility] complaining of chest pain. She states for the past week she has had substernal chest pressure. Worse with movement or deep breathing. She denies any injury. She does feel slightly short of breath. States pain was worse at work today and her blood pressure was elevated so the nurse called an ambulance center here aspirin was given with no change. The history is provided by the patient. Chest Pain    This is a new problem. The current episode started more than 1 week ago. The problem has been gradually worsening. The problem occurs daily. The pain is associated with normal activity. The pain is present in the substernal region. The quality of the pain is described as pressure-like. The pain does not radiate. The symptoms are aggravated by movement, palpation, certain positions and deep breathing. Associated symptoms include shortness of breath. Pertinent negatives include no abdominal pain, no back pain, no cough, no diaphoresis, no fever, no nausea, no palpitations and no weakness. She has tried aspirin for the symptoms. The treatment provided no relief. Risk factors include hypertension and family history. Her past medical history is significant for HTN. Her past medical history does not include DM. Pertinent negatives include no cardiac catheterization.        Past Medical History:   Diagnosis Date    Gastrointestinal disorder     reflux    GERD (gastroesophageal reflux disease)     High cholesterol     Other ill-defined conditions(799.89)     ruptured back disc; endometriosis       Past Surgical History:   Procedure Laterality Date    HX COLONOSCOPY  2007    Normal - workup for anemia    HX CYST REMOVAL Left 1996    Breast cyst removal    HX HYSTERECTOMY      L ovary also taken out - was adhered to bladder 2007 2/2 endometriosis         Family History:   Problem Relation Age of Onset  Heart Disease Father     Diabetes Mother     Kidney Disease Mother        Social History     Socioeconomic History    Marital status: SINGLE     Spouse name: Not on file    Number of children: Not on file    Years of education: Not on file    Highest education level: Not on file   Occupational History    Not on file   Social Needs    Financial resource strain: Not on file    Food insecurity:     Worry: Not on file     Inability: Not on file    Transportation needs:     Medical: Not on file     Non-medical: Not on file   Tobacco Use    Smoking status: Never Smoker    Smokeless tobacco: Never Used   Substance and Sexual Activity    Alcohol use: Yes     Comment: occ    Drug use: No    Sexual activity: Not Currently   Lifestyle    Physical activity:     Days per week: Not on file     Minutes per session: Not on file    Stress: Not on file   Relationships    Social connections:     Talks on phone: Not on file     Gets together: Not on file     Attends Temple service: Not on file     Active member of club or organization: Not on file     Attends meetings of clubs or organizations: Not on file     Relationship status: Not on file    Intimate partner violence:     Fear of current or ex partner: Not on file     Emotionally abused: Not on file     Physically abused: Not on file     Forced sexual activity: Not on file   Other Topics Concern    Not on file   Social History Narrative    Not on file         ALLERGIES: Amlodipine; Dexilant [dexlansoprazole]; and Effexor [venlafaxine]    Review of Systems   Constitutional: Negative for chills, diaphoresis, fatigue and fever. HENT: Negative for congestion, rhinorrhea and sore throat. Eyes: Negative for pain, discharge and visual disturbance. Respiratory: Positive for shortness of breath. Negative for cough. Cardiovascular: Positive for chest pain. Negative for palpitations. Gastrointestinal: Negative for abdominal pain, diarrhea and nausea. Endocrine: Negative for polydipsia and polyuria. Genitourinary: Negative for dysuria, frequency and urgency. Musculoskeletal: Negative for back pain and neck pain. Skin: Negative for rash. Neurological: Negative for seizures, syncope and weakness. Hematological: Negative. Vitals:    09/23/19 1854   BP: (!) 228/135   Pulse: 99   Resp: 18   Temp: 99 °F (37.2 °C)   SpO2: 97%   Weight: 120.7 kg (266 lb)   Height: 5' 8\" (1.727 m)            Physical Exam   Constitutional: She is oriented to person, place, and time. She appears well-developed and well-nourished. Overweight   HENT:   Head: Normocephalic and atraumatic. Eyes: Pupils are equal, round, and reactive to light. Conjunctivae and EOM are normal.   Neck: Normal range of motion. Neck supple. Cardiovascular: Regular rhythm and intact distal pulses. Tachycardia present. Pulmonary/Chest: Effort normal and breath sounds normal.   Markedly tender over the sternum and palpation of this area reproduces her pain. Abdominal: Soft. There is no tenderness. There is no rebound and no guarding. Musculoskeletal: Normal range of motion. She exhibits no edema or deformity. Right lower leg: She exhibits no tenderness and no edema. Left lower leg: She exhibits no tenderness and no edema. Lymphadenopathy:     She has no cervical adenopathy. Neurological: She is alert and oriented to person, place, and time. She has normal strength. No cranial nerve deficit or sensory deficit. GCS eye subscore is 4. GCS verbal subscore is 5. GCS motor subscore is 6. Skin: Skin is warm and dry. No rash noted. Psychiatric: Her mood appears anxious. Nursing note and vitals reviewed.        MDM  Number of Diagnoses or Management Options  Diagnosis management comments: EKG reviewed by me shows sinus tachycardia rate of 105 with no acute ischemia  Initial troponin 0.06 but this is stable and in line with previous values  Other blood work unremarkable  Chest x-ray negative    12:54 AM  repeat troponin unchanged  CT scan of the chest is negative for pulmonary embolism    Repeat blood pressure check by the nurse is normal.  Patient is still very anxious. Clinically I think this is all chest wall pain I have offered her pain medication or anti-inflammatories but she refuses and states she does not like to take medication. She states she will follow-up with her doctor. Voice dictation software was used during the making of this note. This software is not perfect and grammatical and other typographical errors may be present. This note has been proofread, but may still contain errors.   Lalo Reeves MD; 9/24/2019 @12:55 AM   ===================================================================         Amount and/or Complexity of Data Reviewed  Clinical lab tests: ordered and reviewed  Tests in the radiology section of CPT®: ordered and reviewed  Review and summarize past medical records: yes  Independent visualization of images, tracings, or specimens: yes    Risk of Complications, Morbidity, and/or Mortality  Presenting problems: low  Diagnostic procedures: low  Management options: low    Patient Progress  Patient progress: stable         Procedures

## 2019-09-24 NOTE — DISCHARGE INSTRUCTIONS
Use Tylenol or ibuprofen for pain. Follow-up with your doctor or return for any other acute concerns.

## 2019-09-25 ENCOUNTER — HOSPITAL ENCOUNTER (EMERGENCY)
Age: 39
Discharge: HOME | End: 2019-09-25
Attending: EMERGENCY MEDICINE | Admitting: EMERGENCY MEDICINE
Payer: COMMERCIAL

## 2019-09-25 VITALS
BODY MASS INDEX: 40.62 KG/M2 | HEART RATE: 95 BPM | TEMPERATURE: 98.3 F | DIASTOLIC BLOOD PRESSURE: 63 MMHG | RESPIRATION RATE: 20 BRPM | OXYGEN SATURATION: 98 % | WEIGHT: 268 LBS | SYSTOLIC BLOOD PRESSURE: 138 MMHG | HEIGHT: 68 IN

## 2019-09-25 DIAGNOSIS — R07.89 CHEST WALL PAIN: Primary | ICD-10-CM

## 2019-09-25 LAB
ALBUMIN SERPL-MCNC: 3.6 G/DL (ref 3.5–5)
ALBUMIN/GLOB SERPL: 1.1 {RATIO} (ref 1.2–3.5)
ALP SERPL-CCNC: 54 U/L (ref 50–130)
ALT SERPL-CCNC: 71 U/L (ref 12–65)
ANION GAP SERPL CALC-SCNC: 8 MMOL/L (ref 7–16)
AST SERPL-CCNC: 34 U/L (ref 15–37)
ATRIAL RATE: 92 BPM
BASOPHILS # BLD: 0 K/UL (ref 0–0.2)
BASOPHILS NFR BLD: 0 % (ref 0–2)
BILIRUB SERPL-MCNC: 0.4 MG/DL (ref 0.2–1.1)
BUN SERPL-MCNC: 16 MG/DL (ref 6–23)
CALCIUM SERPL-MCNC: 9.3 MG/DL (ref 8.3–10.4)
CALCULATED P AXIS, ECG09: 37 DEGREES
CALCULATED R AXIS, ECG10: 31 DEGREES
CALCULATED T AXIS, ECG11: 17 DEGREES
CHLORIDE SERPL-SCNC: 107 MMOL/L (ref 98–107)
CO2 SERPL-SCNC: 25 MMOL/L (ref 21–32)
CREAT SERPL-MCNC: 0.6 MG/DL (ref 0.6–1)
DIAGNOSIS, 93000: NORMAL
DIFFERENTIAL METHOD BLD: ABNORMAL
EOSINOPHIL # BLD: 0.2 K/UL (ref 0–0.8)
EOSINOPHIL NFR BLD: 3 % (ref 0.5–7.8)
ERYTHROCYTE [DISTWIDTH] IN BLOOD BY AUTOMATED COUNT: 11.7 % (ref 11.9–14.6)
GLOBULIN SER CALC-MCNC: 3.3 G/DL (ref 2.3–3.5)
GLUCOSE SERPL-MCNC: 83 MG/DL (ref 65–100)
HCT VFR BLD AUTO: 38.6 % (ref 35.8–46.3)
HGB BLD-MCNC: 12.4 G/DL (ref 11.7–15.4)
IMM GRANULOCYTES # BLD AUTO: 0 K/UL (ref 0–0.5)
IMM GRANULOCYTES NFR BLD AUTO: 0 % (ref 0–5)
LYMPHOCYTES # BLD: 1.5 K/UL (ref 0.5–4.6)
LYMPHOCYTES NFR BLD: 22 % (ref 13–44)
MCH RBC QN AUTO: 30 PG (ref 26.1–32.9)
MCHC RBC AUTO-ENTMCNC: 32.1 G/DL (ref 31.4–35)
MCV RBC AUTO: 93.2 FL (ref 79.6–97.8)
MONOCYTES # BLD: 0.7 K/UL (ref 0.1–1.3)
MONOCYTES NFR BLD: 10 % (ref 4–12)
NEUTS SEG # BLD: 4.4 K/UL (ref 1.7–8.2)
NEUTS SEG NFR BLD: 65 % (ref 43–78)
NRBC # BLD: 0 K/UL (ref 0–0.2)
P-R INTERVAL, ECG05: 132 MS
PLATELET # BLD AUTO: 242 K/UL (ref 150–450)
PMV BLD AUTO: 10.1 FL (ref 9.4–12.3)
POTASSIUM SERPL-SCNC: 4 MMOL/L (ref 3.5–5.1)
PROT SERPL-MCNC: 6.9 G/DL (ref 6.3–8.2)
Q-T INTERVAL, ECG07: 326 MS
QRS DURATION, ECG06: 86 MS
QTC CALCULATION (BEZET), ECG08: 407 MS
RBC # BLD AUTO: 4.14 M/UL (ref 4.05–5.2)
SODIUM SERPL-SCNC: 140 MMOL/L (ref 136–145)
TROPONIN I SERPL-MCNC: 0.05 NG/ML (ref 0.02–0.05)
VENTRICULAR RATE, ECG03: 94 BPM
WBC # BLD AUTO: 6.8 K/UL (ref 4.3–11.1)

## 2019-09-25 PROCEDURE — 96374 THER/PROPH/DIAG INJ IV PUSH: CPT | Performed by: EMERGENCY MEDICINE

## 2019-09-25 PROCEDURE — 85025 COMPLETE CBC W/AUTO DIFF WBC: CPT

## 2019-09-25 PROCEDURE — 80053 COMPREHEN METABOLIC PANEL: CPT

## 2019-09-25 PROCEDURE — 96375 TX/PRO/DX INJ NEW DRUG ADDON: CPT | Performed by: EMERGENCY MEDICINE

## 2019-09-25 PROCEDURE — 84484 ASSAY OF TROPONIN QUANT: CPT

## 2019-09-25 PROCEDURE — 74011250636 HC RX REV CODE- 250/636: Performed by: EMERGENCY MEDICINE

## 2019-09-25 PROCEDURE — 93005 ELECTROCARDIOGRAM TRACING: CPT | Performed by: EMERGENCY MEDICINE

## 2019-09-25 PROCEDURE — 99285 EMERGENCY DEPT VISIT HI MDM: CPT | Performed by: EMERGENCY MEDICINE

## 2019-09-25 PROCEDURE — 74011250637 HC RX REV CODE- 250/637: Performed by: EMERGENCY MEDICINE

## 2019-09-25 RX ORDER — MAG HYDROX/ALUMINUM HYD/SIMETH 200-200-20
30 SUSPENSION, ORAL (FINAL DOSE FORM) ORAL
Status: COMPLETED | OUTPATIENT
Start: 2019-09-25 | End: 2019-09-25

## 2019-09-25 RX ORDER — INDOMETHACIN 25 MG/1
25 CAPSULE ORAL 3 TIMES DAILY
Qty: 30 CAP | Refills: 0 | Status: SHIPPED | OUTPATIENT
Start: 2019-09-25 | End: 2019-10-05

## 2019-09-25 RX ORDER — SUCRALFATE 1 G/1
1 TABLET ORAL 4 TIMES DAILY
Qty: 90 TAB | Refills: 0 | Status: SHIPPED | OUTPATIENT
Start: 2019-09-25 | End: 2020-02-27

## 2019-09-25 RX ORDER — FAMOTIDINE 10 MG/ML
40 INJECTION INTRAVENOUS
Status: COMPLETED | OUTPATIENT
Start: 2019-09-25 | End: 2019-09-25

## 2019-09-25 RX ORDER — KETOROLAC TROMETHAMINE 30 MG/ML
30 INJECTION, SOLUTION INTRAMUSCULAR; INTRAVENOUS ONCE
Status: COMPLETED | OUTPATIENT
Start: 2019-09-25 | End: 2019-09-25

## 2019-09-25 RX ORDER — HYDROCODONE BITARTRATE AND ACETAMINOPHEN 5; 325 MG/1; MG/1
1 TABLET ORAL
Qty: 10 TAB | Refills: 0 | Status: SHIPPED | OUTPATIENT
Start: 2019-09-25 | End: 2019-09-28

## 2019-09-25 RX ADMIN — ALUMINUM HYDROXIDE, MAGNESIUM HYDROXIDE, AND SIMETHICONE 30 ML: 200; 200; 20 SUSPENSION ORAL at 19:29

## 2019-09-25 RX ADMIN — FAMOTIDINE 40 MG: 10 INJECTION, SOLUTION INTRAVENOUS at 19:29

## 2019-09-25 RX ADMIN — KETOROLAC TROMETHAMINE 30 MG: 30 INJECTION, SOLUTION INTRAMUSCULAR at 20:52

## 2019-09-25 NOTE — LETTER
[address] [address] EMERGENCY DEPT 
[address] [address] [facility] [address] [phone] Work/School Note Date: 9/25/2019 To Whom It May concern: 
 
Phill Molina was seen and treated today in the emergency room by the following provider(s): 
Attending Provider: Hank Pereyra MD. Phill Molina may return to work on 9/26/19 Sincerely, Ricci Brunner, RN

## 2019-09-25 NOTE — ED PROVIDER NOTES
41-year-old female presenting for reevaluation of her chest pain. She was seen 2 days ago for the same thing. She describes left-sided sternal chest pain that hurts worse when she takes a deep breath or presses on the area. Hurts worse when she leans back. During her recent visit she had 2 flat troponins, a nondiagnostic EKG, negative lab work, clear chest x-ray and a normal CT Loren to rule out pulmonary she was sent home with medications for reflux disease and chest wall pain she has had no improvement since then. She was at work today when she leaned forward it became worse point that she \"could not work\". She called EMS and had her so brought here. There is nothing she can do to make it better. The history is provided by the patient. Chest Pain (Angina)    This is a recurrent problem. The current episode started less than 1 hour ago. The problem has not changed since onset. The problem occurs constantly. The pain is associated with normal activity, breathing, emotional upset, coughing and movement. The pain is present in the substernal region. The pain is at a severity of 5/10. The pain is moderate. The quality of the pain is described as stabbing and sharp. The pain does not radiate. Pertinent negatives include no abdominal pain, no back pain, no claudication, no cough, no diaphoresis, no dizziness, no exertional chest pressure, no fever, no headaches, no hemoptysis, no irregular heartbeat, no leg pain, no lower extremity edema, no malaise/fatigue, no nausea, no near-syncope, no numbness, no orthopnea, no palpitations, no PND, no shortness of breath, no sputum production, no vomiting and no weakness. She has tried nothing for the symptoms. The treatment provided no relief. Risk factors include no risk factors.         Past Medical History:   Diagnosis Date    Gastrointestinal disorder     reflux    GERD (gastroesophageal reflux disease)     High cholesterol     Other ill-defined conditions(799.89) ruptured back disc; endometriosis       Past Surgical History:   Procedure Laterality Date    HX COLONOSCOPY  2007    Normal - workup for anemia    HX CYST REMOVAL Left 1996    Breast cyst removal    HX HYSTERECTOMY      L ovary also taken out - was adhered to bladder 2007 2/2 endometriosis         Family History:   Problem Relation Age of Onset    Heart Disease Father     Diabetes Mother     Kidney Disease Mother        Social History     Socioeconomic History    Marital status: SINGLE     Spouse name: Not on file    Number of children: Not on file    Years of education: Not on file    Highest education level: Not on file   Occupational History    Not on file   Social Needs    Financial resource strain: Not on file    Food insecurity:     Worry: Not on file     Inability: Not on file    Transportation needs:     Medical: Not on file     Non-medical: Not on file   Tobacco Use    Smoking status: Never Smoker    Smokeless tobacco: Never Used   Substance and Sexual Activity    Alcohol use: Yes     Comment: occ    Drug use: No    Sexual activity: Not Currently   Lifestyle    Physical activity:     Days per week: Not on file     Minutes per session: Not on file    Stress: Not on file   Relationships    Social connections:     Talks on phone: Not on file     Gets together: Not on file     Attends Sabianism service: Not on file     Active member of club or organization: Not on file     Attends meetings of clubs or organizations: Not on file     Relationship status: Not on file    Intimate partner violence:     Fear of current or ex partner: Not on file     Emotionally abused: Not on file     Physically abused: Not on file     Forced sexual activity: Not on file   Other Topics Concern    Not on file   Social History Narrative    Not on file         ALLERGIES: Amlodipine;  Dexilant [dexlansoprazole]; and Effexor [venlafaxine]    Review of Systems   Constitutional: Negative for diaphoresis, fever and malaise/fatigue. Respiratory: Negative for cough, hemoptysis, sputum production and shortness of breath. Cardiovascular: Positive for chest pain. Negative for palpitations, orthopnea, claudication, PND and near-syncope. Gastrointestinal: Negative for abdominal pain, nausea and vomiting. Musculoskeletal: Negative for back pain. Neurological: Negative for dizziness, weakness, numbness and headaches. All other systems reviewed and are negative. Vitals:    09/25/19 2030 09/25/19 2050 09/25/19 2130 09/25/19 2137   BP: 159/70 163/74 138/63    Pulse: 90 93 95 95   Resp:       Temp:       SpO2: 98% 99% 98% 98%   Weight:       Height:                Physical Exam   Constitutional: She is oriented to person, place, and time. She appears well-developed and well-nourished. Tearful, anxious appearing   HENT:   Head: Normocephalic and atraumatic. Eyes: Pupils are equal, round, and reactive to light. Conjunctivae and EOM are normal.   Neck: Normal range of motion. Neck supple. Cardiovascular: Normal rate, regular rhythm, intact distal pulses and normal pulses. Tenderness to palpation along the left sternal border   Pulmonary/Chest: Effort normal and breath sounds normal.   Abdominal: Soft. Bowel sounds are normal.   Musculoskeletal: Normal range of motion. Neurological: She is alert and oriented to person, place, and time. Skin: Skin is warm and dry. Nursing note and vitals reviewed. MDM  Number of Diagnoses or Management Options  Diagnosis management comments: 25-year-old female presenting for persistent left sternal border pain. Extensive work-up 2 days ago was essentially unremarkable. Discharged with a presumed diagnosis of chest wall pain versus esophagitis. Check basic ACS labs. I am not repeating imaging today. EKG was unchanged from prior. I am going to treat the patient with a GI cocktail and famotidine first and see if there is any improvement.   If no improvement I may treat with Toradol thereafter    ED Course as of Oct 05 0112   Wed Sep 25, 2019   1938 EKG performed shows sinus rhythm 94, normal axis, , QRS 86, QTc is 4 7 with no acute ischemic change. No change from EKG performed 2 days ago    [JS]   2059 Patient felt somewhat better after the GI cocktail and famotidine we are going to add Toradol due to the reproducible anterior chest wall pain.     [JS]      ED Course User Index  [JS] Jovita Garcia MD       Procedures

## 2019-09-26 NOTE — DISCHARGE INSTRUCTIONS
It is unclear exactly what is causing her pain but your exam is most consistent with chest wall pain. The prescribed medications and see if you have any improvement  It may take some time for these medications to kick in. Follow-up with your doctor for further evaluation.

## 2019-09-26 NOTE — ED NOTES
I have reviewed discharge instructions with the patient. The patient verbalized understanding. Patient left ED via Discharge Method: ambulatory to Home with father. Opportunity for questions and clarification provided. Patient given 3 scripts. To continue your aftercare when you leave the hospital, you may receive an automated call from our care team to check in on how you are doing. This is a free service and part of our promise to provide the best care and service to meet your aftercare needs.  If you have questions, or wish to unsubscribe from this service please call [phone]. Thank you for Choosing our [address] [place] [address] Emergency Department.

## 2020-02-21 ENCOUNTER — HOSPITAL ENCOUNTER (EMERGENCY)
Age: 40
Discharge: HOME | End: 2020-02-21
Attending: EMERGENCY MEDICINE
Payer: COMMERCIAL

## 2020-02-21 ENCOUNTER — APPOINTMENT (OUTPATIENT)
Dept: GENERAL RADIOLOGY | Age: 40
End: 2020-02-21
Attending: EMERGENCY MEDICINE
Payer: COMMERCIAL

## 2020-02-21 ENCOUNTER — APPOINTMENT (OUTPATIENT)
Dept: CT IMAGING | Age: 40
End: 2020-02-21
Attending: EMERGENCY MEDICINE
Payer: COMMERCIAL

## 2020-02-21 VITALS
HEART RATE: 96 BPM | TEMPERATURE: 98.5 F | BODY MASS INDEX: 41.37 KG/M2 | RESPIRATION RATE: 18 BRPM | WEIGHT: 273 LBS | HEIGHT: 68 IN | SYSTOLIC BLOOD PRESSURE: 209 MMHG | DIASTOLIC BLOOD PRESSURE: 104 MMHG | OXYGEN SATURATION: 98 %

## 2020-02-21 DIAGNOSIS — I10 ESSENTIAL HYPERTENSION: Primary | ICD-10-CM

## 2020-02-21 DIAGNOSIS — R51.9 ACUTE NONINTRACTABLE HEADACHE, UNSPECIFIED HEADACHE TYPE: ICD-10-CM

## 2020-02-21 LAB
ALBUMIN SERPL-MCNC: 3.8 G/DL (ref 3.5–5)
ALBUMIN/GLOB SERPL: 1 {RATIO} (ref 1.2–3.5)
ALP SERPL-CCNC: 93 U/L (ref 50–130)
ALT SERPL-CCNC: 85 U/L (ref 12–65)
ANION GAP SERPL CALC-SCNC: 6 MMOL/L (ref 7–16)
AST SERPL-CCNC: 69 U/L (ref 15–37)
BASOPHILS # BLD: 0 K/UL (ref 0–0.2)
BASOPHILS NFR BLD: 0 % (ref 0–2)
BILIRUB SERPL-MCNC: 0.5 MG/DL (ref 0.2–1.1)
BUN SERPL-MCNC: 11 MG/DL (ref 6–23)
CALCIUM SERPL-MCNC: 9.7 MG/DL (ref 8.3–10.4)
CHLORIDE SERPL-SCNC: 106 MMOL/L (ref 98–107)
CO2 SERPL-SCNC: 26 MMOL/L (ref 21–32)
CREAT SERPL-MCNC: 0.62 MG/DL (ref 0.6–1)
DIFFERENTIAL METHOD BLD: NORMAL
EOSINOPHIL # BLD: 0.1 K/UL (ref 0–0.8)
EOSINOPHIL NFR BLD: 1 % (ref 0.5–7.8)
ERYTHROCYTE [DISTWIDTH] IN BLOOD BY AUTOMATED COUNT: 13.1 % (ref 11.9–14.6)
GLOBULIN SER CALC-MCNC: 3.8 G/DL (ref 2.3–3.5)
GLUCOSE SERPL-MCNC: 95 MG/DL (ref 65–100)
HCT VFR BLD AUTO: 38.6 % (ref 35.8–46.3)
HGB BLD-MCNC: 12.5 G/DL (ref 11.7–15.4)
IMM GRANULOCYTES # BLD AUTO: 0 K/UL (ref 0–0.5)
IMM GRANULOCYTES NFR BLD AUTO: 0 % (ref 0–5)
LYMPHOCYTES # BLD: 2.2 K/UL (ref 0.5–4.6)
LYMPHOCYTES NFR BLD: 33 % (ref 13–44)
MCH RBC QN AUTO: 29.6 PG (ref 26.1–32.9)
MCHC RBC AUTO-ENTMCNC: 32.4 G/DL (ref 31.4–35)
MCV RBC AUTO: 91.5 FL (ref 79.6–97.8)
MONOCYTES # BLD: 0.7 K/UL (ref 0.1–1.3)
MONOCYTES NFR BLD: 11 % (ref 4–12)
NEUTS SEG # BLD: 3.5 K/UL (ref 1.7–8.2)
NEUTS SEG NFR BLD: 55 % (ref 43–78)
NRBC # BLD: 0 K/UL (ref 0–0.2)
PLATELET # BLD AUTO: 240 K/UL (ref 150–450)
PMV BLD AUTO: 10.2 FL (ref 9.4–12.3)
POTASSIUM SERPL-SCNC: 3.7 MMOL/L (ref 3.5–5.1)
PROT SERPL-MCNC: 7.6 G/DL (ref 6.3–8.2)
RBC # BLD AUTO: 4.22 M/UL (ref 4.05–5.2)
SODIUM SERPL-SCNC: 138 MMOL/L (ref 136–145)
TROPONIN I SERPL-MCNC: 0.06 NG/ML (ref 0.02–0.05)
WBC # BLD AUTO: 6.5 K/UL (ref 4.3–11.1)

## 2020-02-21 PROCEDURE — 70450 CT HEAD/BRAIN W/O DYE: CPT

## 2020-02-21 PROCEDURE — 96374 THER/PROPH/DIAG INJ IV PUSH: CPT

## 2020-02-21 PROCEDURE — 85025 COMPLETE CBC W/AUTO DIFF WBC: CPT

## 2020-02-21 PROCEDURE — 84484 ASSAY OF TROPONIN QUANT: CPT

## 2020-02-21 PROCEDURE — 99285 EMERGENCY DEPT VISIT HI MDM: CPT

## 2020-02-21 PROCEDURE — 71046 X-RAY EXAM CHEST 2 VIEWS: CPT

## 2020-02-21 PROCEDURE — 80053 COMPREHEN METABOLIC PANEL: CPT

## 2020-02-21 PROCEDURE — 93005 ELECTROCARDIOGRAM TRACING: CPT | Performed by: EMERGENCY MEDICINE

## 2020-02-21 PROCEDURE — 74011000250 HC RX REV CODE- 250: Performed by: EMERGENCY MEDICINE

## 2020-02-21 RX ORDER — LABETALOL HYDROCHLORIDE 5 MG/ML
10 INJECTION, SOLUTION INTRAVENOUS
Status: COMPLETED | OUTPATIENT
Start: 2020-02-21 | End: 2020-02-21

## 2020-02-21 RX ADMIN — LABETALOL HYDROCHLORIDE 10 MG: 5 INJECTION INTRAVENOUS at 20:35

## 2020-02-21 NOTE — LETTER
[address] [address] EMERGENCY DEPT 
[address] [facility] 
[facility] [id number] [phone] Work/School Note Date: 2/21/2020 To Whom It May concern: 
 
Rosemary Davey was seen and treated today in the emergency room by the following provider(s): 
Attending Provider: Jarad Walters MD. Rosemary Davey {Return to school/sport/work: February 24, 2020 Sincerely, Moni Ernandez MD

## 2020-02-21 NOTE — ED TRIAGE NOTES
Pt arrives ems from work, states sudden onset of pain behind left ear and some pain on left bicep. States same pain yesterday while she was exerting herself. /100, NSR, no past cardiac hx or blood clots.

## 2020-02-22 LAB
ATRIAL RATE: 83 BPM
CALCULATED P AXIS, ECG09: 22 DEGREES
CALCULATED R AXIS, ECG10: 44 DEGREES
CALCULATED T AXIS, ECG11: 54 DEGREES
DIAGNOSIS, 93000: NORMAL
P-R INTERVAL, ECG05: 126 MS
Q-T INTERVAL, ECG07: 354 MS
QRS DURATION, ECG06: 94 MS
QTC CALCULATION (BEZET), ECG08: 415 MS
VENTRICULAR RATE, ECG03: 83 BPM

## 2020-02-22 NOTE — ED NOTES
I have reviewed discharge instructions with the patient. The patient verbalized understanding. Patient left ED via Discharge Method: ambulatory to Home with family/friend. Opportunity for questions and clarification provided. Patient given 0 scripts. To continue your aftercare when you leave the hospital, you may receive an automated call from our care team to check in on how you are doing. This is a free service and part of our promise to provide the best care and service to meet your aftercare needs.  If you have questions, or wish to unsubscribe from this service please call [phone]. Thank you for Choosing our [facility] Emergency Department.

## 2020-02-22 NOTE — DISCHARGE INSTRUCTIONS
Relaxation techniques. Restart your Lexapro. Take your blood pressure medication. Follow-up with your primary care doctor. He may need to increase your blood pressure medication. Return if any worsening symptoms.

## 2020-02-22 NOTE — ED PROVIDER NOTES
HPI:  79-year-old female here with pain in the back of her left neck and head. Stated while at work yesterday she felt a sharp pain. Stopped in her track. Is in the back left of her head. Then went away. Since then has been waxing and waning. Has not migrated anywhere. She denies any vision changes. Denies any speech difficulty. Today while at work still has some discomfort in the left back of her head and neck. Denies any trauma. Went to the nurse. Was told her blood pressure was really high. They recommended she goes by EMS to the ER for checkup. She denies heavy lifting at work. Stated she does not have to bend over or getting any type of awkward position as part of her work routine. She takes lisinopril 40 mg daily which she took a dose at 2 PM today. Stated she feels somewhat anxious and a little mad that they called 911 to bring her here because she does not want to pay the cost for the EMS. Does not have any pain to palpation of the neck. Currently does not have any significant pain to that same location. She denies any chest pain, shortness of breath, vision changes or any other deficits. ROS  Constitutional: No fever, no chills  Skin: no rash  Eye: No vision changes  ENMT: No sore throat  Respiratory: No shortness of breath, no cough  Cardiovascular: No chest pain, no palpitations  Gastrointestinal: No vomiting, no nausea, no diarrhea, no abdominal pain  : No dysuria  MSK: No back pain, no muscle pain, no joint pain  Neuro: + headache, no change in mental status, no numbness, no tingling, no weakness  Psych:   Endocrine:   All other review of systems positive per history of present illness and the above otherwise negative or noncontributory.     Visit Vitals  BP (!) 209/110 (BP 1 Location: Right arm, BP Patient Position: At rest)   Pulse 94   Temp 98.5 °F (36.9 °C)   Resp 18   Ht 5' 8\" (1.727 m)   Wt 123.8 kg (273 lb)   SpO2 98%   BMI 41.51 kg/m²     Past Medical History:   Diagnosis Date    Gastrointestinal disorder     reflux    GERD (gastroesophageal reflux disease)     High cholesterol     Other ill-defined conditions(799.89)     ruptured back disc; endometriosis     Past Surgical History:   Procedure Laterality Date    HX COLONOSCOPY  2007    Normal - workup for anemia    HX CYST REMOVAL Left 1996    Breast cyst removal    HX HYSTERECTOMY      L ovary also taken out - was adhered to bladder 2007 2/2 endometriosis     Prior to Admission Medications   Prescriptions Last Dose Informant Patient Reported? Taking?   escitalopram oxalate (LEXAPRO) 20 mg tablet   No No   Sig: Take 1 Tab by mouth daily. lisinopril (PRINIVIL, ZESTRIL) 40 mg tablet   No No   Sig: TAKE ONE TABLET BY MOUTH ONE TIME DAILY   lovastatin (MEVACOR) 40 mg tablet   No No   Sig: Take 1 Tab by mouth nightly. raNITIdine (ZANTAC) 150 mg tablet   No No   Sig: Take 1 Tab by mouth two (2) times a day. sucralfate (CARAFATE) 1 gram tablet   No No   Sig: Take 1 Tab by mouth four (4) times daily. sucralfate (CARAFATE) 1 gram tablet   No No   Sig: Take 1 Tab by mouth four (4) times daily. zolpidem (AMBIEN) 5 mg tablet   No No   Sig: Take 1 Tab by mouth nightly as needed for Sleep. Max Daily Amount: 5 mg.       Facility-Administered Medications: None         Adult Exam   General: alert, no acute distress  Head: normocephalic, atraumatic  ENT: moist mucous membranes  Neck: supple, non-tender; full range of motion  Cardiovascular: regular rate and rhythm, normal peripheral perfusion, no edema, equal pulses  Respiratory:  normal respirations; no wheezing, rales or rhonchi  Gastrointestinal: soft, non-tender; no rebound or guarding, no peritoneal signs, no distension  Back: non-tender, full range of motion  Musculoskeletal: normal range of motion, normal strength, no gross deformities  Neurological: alert and oriented x 4, no gross focal deficits; normal speech  Psychiatric: cooperative; appropriate mood and affect    MDM: EKG rate of 83 normal sinus rhythm with normal axis and interval.  No STEMI or ischemic changes noted. No ectopy or Brugada. Neuro exam is benign at this time. Blood pressure was elevated on evaluation. Will recheck. She is somewhat agitated and anxious at this time. Will obtain CT scan of the head to make sure there is no obvious intracranial hemorrhage. Low suspicion for meningitis. Low suspicion for any acute fracture. Will repeat blood pressure after she has had a chance to calm down and relax. If no improvement may need to consider Ativan for agitation and anxiety and potentially labetalol for blood pressure. Lower suspicion for dissection. No obvious carotid bruit noted      10:04 PM  CT head with mild motion however no obvious bleed noted. Speaking with the patient she had significant amount of stressors going on right now from a financial standpoint. Her creatinine is normal.  Her troponin 0 0.06. It appears that has always been between 0.05 and 0.07 going back to 2012 when she was seen by cardiology with a negative work-up. This appear to be her baseline and I do not suspect an acute endorgan injury secondary to hypertension. She does state that she has whitecoat syndrome as well as lots of stressors from her bills right now. She does not have any active headaches at this time. She does not have any neurologic deficit or chest pain or difficulty breathing. I will discharge home with asymptomatic hypertension treatment. Recommend follow-up with her primary care physician for reevaluation and potentially increasing blood pressure treatment. She has no further questions or concerns at this time. CT Results  (Last 48 hours)               02/21/20 1946  CT HEAD WO CONT Final result    Impression:  IMPRESSION:     1. Very limited study without clear acute changes. If the patient's condition   deteriorates, then a repeat study should be considered.                     Narrative:  CT HEAD WITHOUT CONTRAST, 2/21/2020        History: Sudden onset of pain behind left ear. Comparison: None        Technique:   5 mm axial scans from the skull base to the vertex. All CT scans   performed at this facility use one or all of the following: Automated exposure   control, adjustment of the mA and/or kVp according to patient's size, iterative   reconstruction. Findings:     Today's study is limited by patient motion and atypical patient positioning. A   technologist note has been submitted stating that the best possible images were   obtained. The patient was unable to lay on her back or lay still due to nausea   and vomiting. This could limit detection of a subtle finding. No clear evidence   of intracranial hemorrhage is seen. No abnormal extra-axial fluid collections   are seen. No evidence for acute hydrocephalus is seen. No evidence of midline   shift or herniation is seen. No abnormal edema pattern is seen in a vascular   distribution to suggest large artery infarction. Evaluation with bone windows shows no acute osseous changes. The visualized   sinuses, middle ears, and mastoid air cells are well aerated. Xr Chest Pa Lat    Result Date: 2/21/2020  History: chest pain Two views chest Findings: The lungs are well expanded and clear.  The cardiac silhouette, and mediastinal contour, and osseous structures are normal.     Impression: Unremarkable two-view chest.     Recent Results (from the past 24 hour(s))   EKG, 12 LEAD, INITIAL    Collection Time: 02/21/20  6:52 PM   Result Value Ref Range    Ventricular Rate 83 BPM    Atrial Rate 83 BPM    P-R Interval 126 ms    QRS Duration 94 ms    Q-T Interval 354 ms    QTC Calculation (Bezet) 415 ms    Calculated P Axis 22 degrees    Calculated R Axis 44 degrees    Calculated T Axis 54 degrees    Diagnosis       Normal sinus rhythm  Normal ECG  When compared with ECG of 25-SEP-2019 19:11,  No significant change was found     CBC WITH AUTOMATED DIFF    Collection Time: 02/21/20  6:59 PM   Result Value Ref Range    WBC 6.5 4.3 - 11.1 K/uL    RBC 4.22 4.05 - 5.2 M/uL    HGB 12.5 11.7 - 15.4 g/dL    HCT 38.6 35.8 - 46.3 %    MCV 91.5 79.6 - 97.8 FL    MCH 29.6 26.1 - 32.9 PG    MCHC 32.4 31.4 - 35.0 g/dL    RDW 13.1 11.9 - 14.6 %    PLATELET 190 272 - 931 K/uL    MPV 10.2 9.4 - 12.3 FL    ABSOLUTE NRBC 0.00 0.0 - 0.2 K/uL    DF AUTOMATED      NEUTROPHILS 55 43 - 78 %    LYMPHOCYTES 33 13 - 44 %    MONOCYTES 11 4.0 - 12.0 %    EOSINOPHILS 1 0.5 - 7.8 %    BASOPHILS 0 0.0 - 2.0 %    IMMATURE GRANULOCYTES 0 0.0 - 5.0 %    ABS. NEUTROPHILS 3.5 1.7 - 8.2 K/UL    ABS. LYMPHOCYTES 2.2 0.5 - 4.6 K/UL    ABS. MONOCYTES 0.7 0.1 - 1.3 K/UL    ABS. EOSINOPHILS 0.1 0.0 - 0.8 K/UL    ABS. BASOPHILS 0.0 0.0 - 0.2 K/UL    ABS. IMM. GRANS. 0.0 0.0 - 0.5 K/UL         Dragon voice recognition software was used to create this note. Although the note has been reviewed and corrected where necessary, additional errors may have been overlooked and remain in the text.

## 2020-02-27 PROBLEM — H66.013 NON-RECURRENT ACUTE SUPPURATIVE OTITIS MEDIA OF BOTH EARS WITH SPONTANEOUS RUPTURE OF TYMPANIC MEMBRANES: Status: ACTIVE | Noted: 2020-02-27

## 2020-02-27 PROBLEM — Q66.90 FOOT DEFORMITIES, CONGENITAL: Status: ACTIVE | Noted: 2020-02-27

## 2020-02-27 PROBLEM — R74.8 ELEVATED LIVER ENZYMES: Status: ACTIVE | Noted: 2020-02-27

## 2020-11-18 PROBLEM — E05.90 THYROTOXICOSIS WITHOUT CRISIS: Status: ACTIVE | Noted: 2020-11-18

## 2020-11-18 PROBLEM — E83.52 HYPERCALCEMIA: Status: ACTIVE | Noted: 2020-11-18

## 2021-01-29 PROBLEM — H72.11: Status: ACTIVE | Noted: 2017-12-12

## 2021-01-29 PROBLEM — R80.9 PROTEINURIA: Status: ACTIVE | Noted: 2021-01-29

## 2021-02-10 PROBLEM — R11.12 PROJECTILE VOMITING WITH NAUSEA: Status: ACTIVE | Noted: 2021-02-10

## 2021-02-16 PROBLEM — R31.9 HEMATURIA: Status: ACTIVE | Noted: 2021-02-16

## 2021-03-03 PROBLEM — F10.11 HISTORY OF ALCOHOL ABUSE: Status: ACTIVE | Noted: 2021-03-03

## 2021-03-03 PROBLEM — E05.00 GRAVES' DISEASE: Status: ACTIVE | Noted: 2021-03-03

## 2021-04-13 PROBLEM — T50.905A REACTION TO SHOT: Status: ACTIVE | Noted: 2021-04-13

## 2021-04-13 PROBLEM — J31.0 RHINITIS: Status: ACTIVE | Noted: 2021-04-13

## 2021-06-04 PROBLEM — Z91.199 MEDICALLY NONCOMPLIANT: Status: ACTIVE | Noted: 2021-06-04

## 2021-07-06 ENCOUNTER — TRANSCRIBE ORDER (OUTPATIENT)
Dept: SCHEDULING | Age: 41
End: 2021-07-06

## 2021-07-06 DIAGNOSIS — Z12.31 SCREENING MAMMOGRAM FOR HIGH-RISK PATIENT: Primary | ICD-10-CM

## 2022-03-18 PROBLEM — E83.52 HYPERCALCEMIA: Status: ACTIVE | Noted: 2020-11-18

## 2022-03-18 PROBLEM — R10.10 PAIN OF UPPER ABDOMEN: Status: ACTIVE | Noted: 2017-03-24

## 2022-03-19 PROBLEM — E05.00 GRAVES' DISEASE: Status: ACTIVE | Noted: 2021-03-03

## 2022-03-19 PROBLEM — R31.9 HEMATURIA: Status: ACTIVE | Noted: 2021-02-16

## 2022-03-19 PROBLEM — R74.8 ABNORMAL LIVER ENZYMES: Status: ACTIVE | Noted: 2020-02-27

## 2022-03-19 PROBLEM — H72.11: Status: ACTIVE | Noted: 2017-12-12

## 2022-03-19 PROBLEM — S93.402A SPRAIN OF ANKLE, LEFT: Status: ACTIVE | Noted: 2017-09-22

## 2022-03-19 PROBLEM — E05.90 HYPERTHYROIDISM: Status: ACTIVE | Noted: 2020-11-18

## 2022-03-19 PROBLEM — Z87.898 HISTORY OF PREDIABETES: Status: ACTIVE | Noted: 2017-01-04

## 2022-03-19 PROBLEM — Z91.199 MEDICALLY NONCOMPLIANT: Status: ACTIVE | Noted: 2021-06-04

## 2022-03-19 PROBLEM — F51.01 PRIMARY INSOMNIA: Status: ACTIVE | Noted: 2017-12-12

## 2022-03-19 PROBLEM — D48.9 NEOPLASM, UNCERTAIN WHETHER BENIGN OR MALIGNANT: Status: ACTIVE | Noted: 2018-09-06

## 2022-03-19 PROBLEM — H66.013 NON-RECURRENT ACUTE SUPPURATIVE OTITIS MEDIA OF BOTH EARS WITH SPONTANEOUS RUPTURE OF TYMPANIC MEMBRANES: Status: ACTIVE | Noted: 2020-02-27

## 2022-03-19 PROBLEM — K76.0 HEPATIC STEATOSIS: Status: ACTIVE | Noted: 2017-03-28

## 2022-03-19 PROBLEM — R80.9 PROTEINURIA: Status: ACTIVE | Noted: 2021-01-29

## 2022-03-19 PROBLEM — Q66.90 FOOT DEFORMITIES, CONGENITAL: Status: ACTIVE | Noted: 2020-02-27

## 2022-03-20 PROBLEM — N80.9 ENDOMETRIOSIS: Status: ACTIVE | Noted: 2017-01-04

## 2022-03-20 PROBLEM — R11.12 PROJECTILE VOMITING WITH NAUSEA: Status: ACTIVE | Noted: 2021-02-10

## 2022-03-20 PROBLEM — F10.11 HISTORY OF ALCOHOL ABUSE: Status: ACTIVE | Noted: 2021-03-03

## 2022-03-20 PROBLEM — J31.0 RHINITIS: Status: ACTIVE | Noted: 2021-04-13

## 2022-03-20 PROBLEM — T50.905A REACTION TO SHOT: Status: ACTIVE | Noted: 2021-04-13

## 2022-03-20 PROBLEM — F10.280 ALCOHOL DEPENDENCE WITH ALCOHOL-INDUCED ANXIETY DISORDER (HCC): Status: ACTIVE | Noted: 2019-02-27

## 2022-08-29 ENCOUNTER — OFFICE VISIT (OUTPATIENT)
Dept: ENDOCRINOLOGY | Age: 42
End: 2022-08-29
Payer: COMMERCIAL

## 2022-08-29 VITALS
DIASTOLIC BLOOD PRESSURE: 100 MMHG | BODY MASS INDEX: 47.9 KG/M2 | WEIGHT: 293 LBS | HEART RATE: 104 BPM | OXYGEN SATURATION: 96 % | SYSTOLIC BLOOD PRESSURE: 144 MMHG

## 2022-08-29 DIAGNOSIS — E55.9 VITAMIN D DEFICIENCY: ICD-10-CM

## 2022-08-29 DIAGNOSIS — E05.00 GRAVES' DISEASE: ICD-10-CM

## 2022-08-29 DIAGNOSIS — R30.0 DYSURIA: ICD-10-CM

## 2022-08-29 DIAGNOSIS — E05.90 HYPERTHYROIDISM: Primary | ICD-10-CM

## 2022-08-29 PROCEDURE — 99214 OFFICE O/P EST MOD 30 MIN: CPT | Performed by: INTERNAL MEDICINE

## 2022-08-29 RX ORDER — METHIMAZOLE 10 MG/1
15 TABLET ORAL DAILY
Qty: 135 TABLET | Refills: 3
Start: 2022-08-29 | End: 2022-08-30 | Stop reason: SDUPTHER

## 2022-08-29 NOTE — PROGRESS NOTES
Mika Owen MD, [facility]            Reason for visit: Follow-up of hyperthyroidism      ASSESSMENT AND PLAN:    1. Hyperthyroidism  I will check thyroid function today and notify her of the results. Return in 4 months with labs. - TSH; Future  - T4, Free; Future  - T3; Future  - Comprehensive Metabolic Panel; Future  - methIMAzole (TAPAZOLE) 10 MG tablet; Take 1.5 tablets by mouth daily  Dispense: 135 tablet; Refill: 3  - TSH; Future  - T4, Free; Future  - T3; Future  - Comprehensive Metabolic Panel; Future    2. Graves' disease    3. Dysuria  I offered to check a urinalysis today, but I would not have results until tomorrow. She would prefer to go to urgent care after she leaves this office. 4. Vitamin D deficiency  I will check vitamin D today. - Vitamin D 25 Hydroxy; Future  - vitamin D (CHOLECALCIFEROL) 23185 UNIT CAPS; Take 1 capsule by mouth once a week  Dispense: 13 capsule; Refill: 3  - Vitamin D 25 Hydroxy; Future      Follow-up and Dispositions    Return in about 4 months (around 12/29/2022). History of Present Illness:    THYROID DYSFUNCTION  Alvarado Coker is seen for follow-up of hyperthyroidism; this was diagnosed in July 2020. Current symptoms: See review of systems below     Prior treatment: Methimazole 10 mg daily was started 7/9/2020. She ran out in December 2020 or January 2021 and resumed it in February 2021. Her dose has been further adjusted as follows:  -15 mg daily 10/11/2021     Pertinent labs:  5/8/2010: TSH 3.407.  11/30/2016: TSH 3.090.  7/7/2020: TSH less than 0.006, free T4 3.22.  10/27/2020: TSH 1.070, free T4 1.08.  11/18/2020: TSH 0.013, free T4 1.59, T3 141, thyroid-stimulating immunoglobulins 5.77 (+).   1/14/2021: TSH less than 0.005, free T4 2.36, T3 196.  4/22/2021: TSH 0.006, free T4 1.58.  6/4/2021: TSH 1.510, free T4 1.47.  10/8/2021: TSH 0.042, free T4 1.30, T3 107, 25-hydroxy vitamin D19.8.  2022: TSH 39.000, free T4 0.38, T3 77, 25-hydroxy vitamin D 27.2. Imagin2020: Ultrasound ()- Right lobe 3.71 x 5.08 x 7.25 cm, isthmus 0.84 cm, left lobe 2.00 x 2.00 x 4.27 cm. Heterogeneous echotexture. Normal blood flow. No obvious nodules (study technically difficult due to body habitus and patient's inability to extend her neck). Goran Holm is here for follow-up of hypercalcemia. Presentation/diagnosis: incidentally noted on labs 2020     Related comorbidies/clinical features:   -Metabolic bone disease: none/unknown  -Fragility fractures: none  -Nephrolithiasis: none  -Renal dysfunction: none  -Thiazide diuretic use: none  -Calcium/vitamin D supplementation: Ergocalciferol 50,000 units weekly. Bone densitometry/DXA:  No prior     Symptoms: See review of systems below     Labs:  Multiple normal calcium levels from  to 2020.  2020: Calcium 10.6, albumin 4.5.  2020: Calcium 10.8, intact parathyroid hormone 28, 25-hydroxy vitamin D 13.9, (1,25) dihydroxy vitamin D 37.0.  10/27/2020: Calcium 9.8, albumin 4.7, 25-hydroxy vitamin D 25.1.  2020: Calcium 10.1, phosphorus 3.9, albumin 4.7, intact parathyroid hormone 35, 25-hydroxy vitamin D 16.1.  2021: Calcium 9.6, albumin 4.6, creatinine 0.72.  2021: Calcium 9.6, creatinine 0.78. Imaging:  none    Review of Systems   Constitutional:  Positive for fatigue and unexpected weight change (gained 15 pounds in 15 months). Cardiovascular:  Positive for palpitations. Genitourinary:  Positive for difficulty urinating, dysuria and urgency.      BP (!) 144/100 (Site: Left Upper Arm, Position: Sitting)   Pulse (!) 104   Wt (!) 315 lb (142.9 kg)   SpO2 96%   BMI 47.90 kg/m²   Wt Readings from Last 3 Encounters:   22 (!) 315 lb (142.9 kg)   10/08/21 (!) 309 lb 14.4 oz (140.6 kg)   21 300 lb (136.1 kg)       Physical Exam  HENT:      Head: some errors in transcription may be present.

## 2022-08-30 DIAGNOSIS — E05.90 HYPERTHYROIDISM: ICD-10-CM

## 2022-08-30 DIAGNOSIS — E55.9 VITAMIN D DEFICIENCY: ICD-10-CM

## 2022-08-30 LAB
25(OH)D3 SERPL-MCNC: 29.6 NG/ML (ref 30–100)
ALBUMIN SERPL-MCNC: 4.2 G/DL (ref 3.5–5)
ALBUMIN/GLOB SERPL: 1 {RATIO} (ref 1.2–3.5)
ALP SERPL-CCNC: 121 U/L (ref 50–136)
ALT SERPL-CCNC: 151 U/L (ref 12–65)
ANION GAP SERPL CALC-SCNC: 10 MMOL/L (ref 7–16)
AST SERPL-CCNC: 157 U/L (ref 15–37)
BILIRUB SERPL-MCNC: 1.2 MG/DL (ref 0.2–1.1)
BUN SERPL-MCNC: 7 MG/DL (ref 6–23)
CALCIUM SERPL-MCNC: 9.4 MG/DL (ref 8.3–10.4)
CHLORIDE SERPL-SCNC: 99 MMOL/L (ref 98–107)
CO2 SERPL-SCNC: 25 MMOL/L (ref 21–32)
CREAT SERPL-MCNC: 1 MG/DL (ref 0.6–1)
GLOBULIN SER CALC-MCNC: 4.2 G/DL (ref 2.3–3.5)
GLUCOSE SERPL-MCNC: 113 MG/DL (ref 65–100)
POTASSIUM SERPL-SCNC: 3.8 MMOL/L (ref 3.5–5.1)
PROT SERPL-MCNC: 8.4 G/DL (ref 6.3–8.2)
SODIUM SERPL-SCNC: 134 MMOL/L (ref 136–145)
T3 SERPL-MCNC: 1 NG/ML (ref 0.6–1.81)
T4 FREE SERPL-MCNC: 0.9 NG/DL (ref 0.78–1.46)
TSH, 3RD GENERATION: 9.97 UIU/ML (ref 0.36–3.74)

## 2022-08-30 RX ORDER — METHIMAZOLE 10 MG/1
10 TABLET ORAL DAILY
Qty: 90 TABLET | Refills: 3 | Status: SHIPPED | OUTPATIENT
Start: 2022-08-30

## 2022-08-30 NOTE — PROGRESS NOTES
My Chart message to patient:    Thank you for doing labs. You are overtreated with methimazole. Please reduce your dose from 1-1/2 pills/day to 1 pill/day (totaling 10 mg/day). Continue vitamin D replacement as prescribed as well. I sent prescriptions for both methimazole and vitamin D to your pharmacy. Please remember to recheck labs a couple of days before the next appointment with me.

## 2023-08-16 NOTE — PROGRESS NOTES
Robert Mosley MD, [facility]            Reason for visit: Follow-up of hyperthyroidism      ASSESSMENT AND PLAN:    1. Hyperthyroidism  I last saw Ms. Jose Luis read a year ago. It has been difficult for me to provide her with good care, as she does not keep office appointments and is not taking methimazole as I have prescribed her. She is taking more methimazole than prescribed by me. She recently increased her dose on her own because her primary care nurse practitioner told her that her thyroid levels were \"elevated\". Her TSH was elevated, suggesting that she was overtreated. I suspect that she is significantly overtreated now. I will check thyroid function today and notify her of results. - methIMAzole (TAPAZOLE) 10 MG tablet; Take 2 tablets by mouth daily  Dispense: 90 tablet; Refill: 3  - TSH; Future  - T4, Free; Future  - T3; Future  - Hepatic Function Panel; Future  - TSH; Future  - T4, Free; Future  - T3; Future  - Hepatic Function Panel; Future    2. Graves' disease    3. Vitamin D deficiency  I will check her vitamin D today. - vitamin D (CHOLECALCIFEROL) 00233 UNIT CAPS; Take 1 capsule by mouth once a week  Dispense: 13 capsule; Refill: 3  - Vitamin D 25 Hydroxy; Future    4. Multinodular goiter  Recent ultrasound done at [facility] (I cannot access the images) apparently demonstrates multiple thyroid nodules. I was unaware of that until today. Her ACR criteria, the large nodule in the right lobe warrants biopsy. I will do that at her next appointment. Follow-up and Dispositions    Return in about 6 weeks (around 9/28/2023) for thyroid biopsy. History of Present Illness:    THYROID DYSFUNCTION  Gretchen Eliz read is seen for follow-up of hyperthyroidism; this was diagnosed in July 2020. I last saw her in August 2022. She was to have followed up with me 4 months later but did not do so.   She has canceled 2 appointments since I last

## 2023-08-17 ENCOUNTER — OFFICE VISIT (OUTPATIENT)
Dept: ENDOCRINOLOGY | Age: 43
End: 2023-08-17
Payer: COMMERCIAL

## 2023-08-17 VITALS — DIASTOLIC BLOOD PRESSURE: 122 MMHG | SYSTOLIC BLOOD PRESSURE: 178 MMHG | WEIGHT: 293 LBS | BODY MASS INDEX: 47.9 KG/M2

## 2023-08-17 DIAGNOSIS — E05.90 HYPERTHYROIDISM: Primary | ICD-10-CM

## 2023-08-17 DIAGNOSIS — E05.00 GRAVES' DISEASE: ICD-10-CM

## 2023-08-17 DIAGNOSIS — E04.2 MULTINODULAR GOITER: ICD-10-CM

## 2023-08-17 DIAGNOSIS — E55.9 VITAMIN D DEFICIENCY: ICD-10-CM

## 2023-08-17 DIAGNOSIS — E05.90 HYPERTHYROIDISM: ICD-10-CM

## 2023-08-17 LAB
25(OH)D3 SERPL-MCNC: 27.3 NG/ML (ref 30–100)
ALBUMIN SERPL-MCNC: 4.3 G/DL (ref 3.5–5)
ALBUMIN/GLOB SERPL: 1.2 (ref 0.4–1.6)
ALP SERPL-CCNC: 117 U/L (ref 50–136)
ALT SERPL-CCNC: 168 U/L (ref 12–65)
AST SERPL-CCNC: 202 U/L (ref 15–37)
BILIRUB DIRECT SERPL-MCNC: 0.5 MG/DL
BILIRUB SERPL-MCNC: 1 MG/DL (ref 0.2–1.1)
GLOBULIN SER CALC-MCNC: 3.7 G/DL (ref 2.8–4.5)
PROT SERPL-MCNC: 8 G/DL (ref 6.3–8.2)
T3 SERPL-MCNC: 1.08 NG/ML (ref 0.6–1.81)
T4 FREE SERPL-MCNC: 1 NG/DL (ref 0.78–1.46)
TSH, 3RD GENERATION: 13.6 UIU/ML (ref 0.36–3.74)

## 2023-08-17 PROCEDURE — 99215 OFFICE O/P EST HI 40 MIN: CPT | Performed by: INTERNAL MEDICINE

## 2023-08-17 PROCEDURE — 3080F DIAST BP >= 90 MM HG: CPT | Performed by: INTERNAL MEDICINE

## 2023-08-17 PROCEDURE — 3077F SYST BP >= 140 MM HG: CPT | Performed by: INTERNAL MEDICINE

## 2023-08-17 RX ORDER — HYDROCHLOROTHIAZIDE 25 MG/1
25 TABLET ORAL DAILY
Qty: 30 TABLET | Refills: 11 | COMMUNITY
Start: 2023-01-03 | End: 2024-01-03

## 2023-08-17 RX ORDER — METHIMAZOLE 10 MG/1
20 TABLET ORAL DAILY
Qty: 90 TABLET | Refills: 3
Start: 2023-08-17 | End: 2023-08-17 | Stop reason: ALTCHOICE

## 2023-08-17 NOTE — PROGRESS NOTES
Please let Ms. Jose Luis read know that her thyroid levels are low (i.e. she is overtreated with methimazole). Also, her liver tests are abnormal, probably a side effect of the methimazole. I would like for her to stop the methimazole altogether. Ask her to return to the lab in our office in 1 week to repeat lab testing (see order attached).

## 2023-08-18 NOTE — PROGRESS NOTES
Pt given message below. She expressed understanding. She have lab work drawn in our building in a week.

## 2023-09-01 DIAGNOSIS — E05.90 HYPERTHYROIDISM: ICD-10-CM

## 2023-09-01 LAB
ALBUMIN SERPL-MCNC: 3.9 G/DL (ref 3.5–5)
ALBUMIN/GLOB SERPL: 1.1 (ref 0.4–1.6)
ALP SERPL-CCNC: 93 U/L (ref 50–136)
ALT SERPL-CCNC: 103 U/L (ref 12–65)
ANION GAP SERPL CALC-SCNC: 6 MMOL/L (ref 2–11)
AST SERPL-CCNC: 99 U/L (ref 15–37)
BILIRUB SERPL-MCNC: 0.8 MG/DL (ref 0.2–1.1)
BUN SERPL-MCNC: 11 MG/DL (ref 6–23)
CALCIUM SERPL-MCNC: 9.5 MG/DL (ref 8.3–10.4)
CHLORIDE SERPL-SCNC: 107 MMOL/L (ref 101–110)
CO2 SERPL-SCNC: 24 MMOL/L (ref 21–32)
CREAT SERPL-MCNC: 0.9 MG/DL (ref 0.6–1)
GLOBULIN SER CALC-MCNC: 3.6 G/DL (ref 2.8–4.5)
GLUCOSE SERPL-MCNC: 100 MG/DL (ref 65–100)
POTASSIUM SERPL-SCNC: 3.9 MMOL/L (ref 3.5–5.1)
PROT SERPL-MCNC: 7.5 G/DL (ref 6.3–8.2)
SODIUM SERPL-SCNC: 137 MMOL/L (ref 133–143)
T3 SERPL-MCNC: 1.29 NG/ML (ref 0.6–1.81)
T4 FREE SERPL-MCNC: 1.4 NG/DL (ref 0.78–1.46)
TSH, 3RD GENERATION: 0.51 UIU/ML (ref 0.36–3.74)

## 2023-09-05 DIAGNOSIS — E05.90 HYPERTHYROIDISM: Primary | ICD-10-CM

## 2023-09-28 ENCOUNTER — OFFICE VISIT (OUTPATIENT)
Dept: ENDOCRINOLOGY | Age: 43
End: 2023-09-28

## 2023-09-28 VITALS
OXYGEN SATURATION: 98 % | BODY MASS INDEX: 47.9 KG/M2 | WEIGHT: 293 LBS | SYSTOLIC BLOOD PRESSURE: 130 MMHG | DIASTOLIC BLOOD PRESSURE: 90 MMHG | HEART RATE: 82 BPM

## 2023-09-28 DIAGNOSIS — E05.90 HYPERTHYROIDISM: ICD-10-CM

## 2023-09-28 DIAGNOSIS — E05.90 HYPERTHYROIDISM: Primary | ICD-10-CM

## 2023-09-28 DIAGNOSIS — R74.01 ELEVATED LIVER TRANSAMINASE LEVEL: ICD-10-CM

## 2023-09-28 DIAGNOSIS — E05.00 GRAVES' DISEASE: ICD-10-CM

## 2023-09-28 DIAGNOSIS — E55.9 VITAMIN D DEFICIENCY: ICD-10-CM

## 2023-09-28 DIAGNOSIS — E04.2 MULTINODULAR GOITER: ICD-10-CM

## 2023-09-28 LAB
ALBUMIN SERPL-MCNC: 3.3 G/DL (ref 3.5–5)
ALBUMIN/GLOB SERPL: 0.9 (ref 0.4–1.6)
ALP SERPL-CCNC: 86 U/L (ref 50–136)
ALT SERPL-CCNC: 70 U/L (ref 12–65)
ANION GAP SERPL CALC-SCNC: 4 MMOL/L (ref 2–11)
AST SERPL-CCNC: 58 U/L (ref 15–37)
BILIRUB SERPL-MCNC: 0.4 MG/DL (ref 0.2–1.1)
BUN SERPL-MCNC: 16 MG/DL (ref 6–23)
CALCIUM SERPL-MCNC: 9.5 MG/DL (ref 8.3–10.4)
CHLORIDE SERPL-SCNC: 110 MMOL/L (ref 101–110)
CO2 SERPL-SCNC: 27 MMOL/L (ref 21–32)
CREAT SERPL-MCNC: 0.8 MG/DL (ref 0.6–1)
GLOBULIN SER CALC-MCNC: 3.8 G/DL (ref 2.8–4.5)
GLUCOSE SERPL-MCNC: 99 MG/DL (ref 65–100)
POTASSIUM SERPL-SCNC: 4.3 MMOL/L (ref 3.5–5.1)
PROT SERPL-MCNC: 7.1 G/DL (ref 6.3–8.2)
SODIUM SERPL-SCNC: 141 MMOL/L (ref 133–143)
T3 SERPL-MCNC: 1.85 NG/ML (ref 0.6–1.81)
T4 FREE SERPL-MCNC: 2.2 NG/DL (ref 0.78–1.46)
TSH, 3RD GENERATION: 0.01 UIU/ML (ref 0.36–3.74)

## 2023-09-28 ASSESSMENT — ENCOUNTER SYMPTOMS
DIARRHEA: 0
VOICE CHANGE: 1
TROUBLE SWALLOWING: 0
CONSTIPATION: 0

## 2023-09-28 NOTE — PROGRESS NOTES
Jaclynn Closs, MD, [facility]            Reason for visit: Follow-up of hyperthyroidism      ASSESSMENT AND PLAN:    1. Hyperthyroidism  She has a history of hyperthyroidism. I discontinued methimazole due to hepatic transaminase elevations. Her most recent thyroid labs were normal, and her hepatic transaminases had improved off of methimazole. She had labs checked earlier today. I will follow up on results.  - TSH; Future  - T4, Free; Future  - T3; Future  - Comprehensive Metabolic Panel; Future    2. Graves' disease  - Thyroid Stimulating Immunoglobulin; Future    3. Multinodular goiter  As she and I had previously discussed, I recommended the nodule at the right lower pole be biopsied. That was done today. I will call her with cytopathology results. I will repeat her ultrasound in 1 year. - FINE NEEDLE ASPIRATION BX W/US GDN 1ST LESION    4. Vitamin D deficiency  - Vitamin D 25 Hydroxy; Future    5. Elevated liver transaminase level  As above. PROCEDURES:    FINE NEEDLE ASPIRATION BIOPSY OF RIGHT LOBE THYROID NODULE  Consent was obtained; risks and benefits were explained, including but not limited to bruising and bleeding. A pre-procedure time-out was done. Neck was cleansed with alcohol. 1% lidocaine local anesthesia was administered. The thyroid nodule was visualized with ultrasound using a \"hockey stick\" probe. Using sterile gel and probe cover, five passes with 27G needles were made. The needles were visualized within the nodule each time. Specimens were prepared (3 passes aspirated into Cytolyt, 2 passes aspirated into vial for [employer] analysis) and sent for analysis. Label, name, and  were confirmed with the patient in the room. Post-procedure ultrasound was performed; no complications were noted. I advised the patient to call for any swelling, dysphagia, dysphasia, or dyspnea.  A copy of \"Thyroid

## 2023-09-28 NOTE — PATIENT INSTRUCTIONS
THYROID FINE NEEDLE BIOPSY AFTER-CARE INSTRUCTIONS    WHAT YOU SHOULD KNOW:  Fine needle aspiration biopsy is a simple in-office procedure to remove microscopic amounts of tissue from nodules/lumps in the thyroid gland. This tissue will be sent to an expert thyroid pathologist for evaluation. AFTER YOU LEAVE:  A small amount of bruising, swelling, and tenderness after the biopsy is normal and expected. You may use cold compresses for relief of symptoms. You may also use ibuprofen (Motrin) or acetaminophen (Tylenol) for relief of tenderness. Notify our office if you experience any of the following:  Persistent pain/discomfort at site of biopsy  Swollen lump at site of biopsy  Difficulty swallowing  WHAT WILL HAPPEN NEXT:  The specimens from our office will be sent to an expert thyroid pathologist.  The pathologist will evaluate the tissue sampled at the time of biopsy. The pathologist will make two major determinations. First, he/she will determine if there is enough tissue to make a diagnosis. Usually, the specimen contains plenty of tissue. However, in the event that the specimen contains only fluid but no cells, a repeat biopsy might be necessary. Second, he/she will determine whether or not the nodule contains cancer cells. Typically, a definitive diagnosis can be made (i.e. benign or malignant). However, 5-10% of the time, a definitive diagnosis cannot be made; in this event, a repeat biopsy attempt or some other test or even referral to a thyroid surgeon might be necessary. Biopsy results typically take 4-10 business days to be available. As soon as they are available, a member of our office staff will call you to notify you of the results. If you have not heard from our office 10 days after your biopsy, please call our office so that we can locate your results for you. Please do not hesitate to call our office ([phone]) if you have questions or concerns.

## 2024-02-22 ENCOUNTER — OFFICE VISIT (OUTPATIENT)
Dept: ENDOCRINOLOGY | Age: 44
End: 2024-02-22

## 2024-02-22 VITALS — DIASTOLIC BLOOD PRESSURE: 90 MMHG | SYSTOLIC BLOOD PRESSURE: 144 MMHG | BODY MASS INDEX: 46.53 KG/M2 | WEIGHT: 293 LBS

## 2024-02-22 DIAGNOSIS — E55.9 VITAMIN D DEFICIENCY: ICD-10-CM

## 2024-02-22 DIAGNOSIS — R00.2 PALPITATIONS: ICD-10-CM

## 2024-02-22 DIAGNOSIS — E05.90 HYPERTHYROIDISM: Primary | ICD-10-CM

## 2024-02-22 DIAGNOSIS — F10.10 ALCOHOL ABUSE: ICD-10-CM

## 2024-02-22 DIAGNOSIS — E04.2 MULTINODULAR GOITER: ICD-10-CM

## 2024-02-22 DIAGNOSIS — R74.01 ELEVATED LIVER TRANSAMINASE LEVEL: ICD-10-CM

## 2024-02-22 DIAGNOSIS — E05.90 HYPERTHYROIDISM: ICD-10-CM

## 2024-02-22 DIAGNOSIS — E05.00 GRAVES' DISEASE: ICD-10-CM

## 2024-02-22 LAB
25(OH)D3 SERPL-MCNC: 29.3 NG/ML (ref 30–100)
ALBUMIN SERPL-MCNC: 4.1 G/DL (ref 3.5–5)
ALBUMIN/GLOB SERPL: 1.1 (ref 0.4–1.6)
ALP SERPL-CCNC: 100 U/L (ref 50–136)
ALT SERPL-CCNC: 160 U/L (ref 12–65)
ANION GAP SERPL CALC-SCNC: 7 MMOL/L (ref 2–11)
AST SERPL-CCNC: 166 U/L (ref 15–37)
BILIRUB SERPL-MCNC: 1 MG/DL (ref 0.2–1.1)
BUN SERPL-MCNC: 12 MG/DL (ref 6–23)
CALCIUM SERPL-MCNC: 10.5 MG/DL (ref 8.3–10.4)
CHLORIDE SERPL-SCNC: 105 MMOL/L (ref 103–113)
CO2 SERPL-SCNC: 26 MMOL/L (ref 21–32)
CREAT SERPL-MCNC: 0.9 MG/DL (ref 0.6–1)
GLOBULIN SER CALC-MCNC: 3.8 G/DL (ref 2.8–4.5)
GLUCOSE SERPL-MCNC: 92 MG/DL (ref 65–100)
POTASSIUM SERPL-SCNC: 4.1 MMOL/L (ref 3.5–5.1)
PROT SERPL-MCNC: 7.9 G/DL (ref 6.3–8.2)
SODIUM SERPL-SCNC: 138 MMOL/L (ref 136–146)
T3 SERPL-MCNC: 1.05 NG/ML (ref 0.6–1.81)
T4 FREE SERPL-MCNC: 1.4 NG/DL (ref 0.78–1.46)
TSH, 3RD GENERATION: 0.58 UIU/ML (ref 0.36–3.74)

## 2024-02-22 PROCEDURE — 99214 OFFICE O/P EST MOD 30 MIN: CPT | Performed by: INTERNAL MEDICINE

## 2024-02-22 PROCEDURE — 3077F SYST BP >= 140 MM HG: CPT | Performed by: INTERNAL MEDICINE

## 2024-02-22 PROCEDURE — 3080F DIAST BP >= 90 MM HG: CPT | Performed by: INTERNAL MEDICINE

## 2024-02-22 RX ORDER — METOPROLOL SUCCINATE 50 MG/1
50 TABLET, EXTENDED RELEASE ORAL DAILY
Qty: 90 TABLET | Refills: 3
Start: 2024-02-22

## 2024-02-22 ASSESSMENT — ENCOUNTER SYMPTOMS
VOICE CHANGE: 1
CONSTIPATION: 0
TROUBLE SWALLOWING: 0
DIARRHEA: 0

## 2024-02-22 NOTE — PROGRESS NOTES
labs:  2010: TSH 3.407.  2016: TSH 3.090.  2020: TSH less than 0.006, free T4 3.22.  10/27/2020: TSH 1.070, free T4 1.08.  2020: TSH 0.013, free T4 1.59, T3 141, thyroid-stimulating immunoglobulins 5.77 (+).  2021: TSH less than 0.005, free T4 2.36, T3 196.  2021: TSH 0.006, free T4 1.58.  2021: TSH 1.510, free T4 1.47.  10/8/2021: TSH 0.042, free T4 1.30, T3 107, 25-hydroxy vitamin D19.8.  2022: TSH 39.000, free T4 0.38, T3 77, 25-hydroxy vitamin D 27.2.  2022: TSH 9.970, free T4 0.9, T3 1.00, 25-hydroxy vitamin D 29.6.  2023: TSH 8.160, free T4 0.94.  2023: TSH 13.600, free T4 1.0, T3 1.08, 25-hydroxy vitamin D 27.3, , , alkaline phosphatase 117.  2023: TSH 0.511, free T4 1.4, T3 1.29, , AST 99, alkaline phosphatase 93.  2023: TSH 0.007, free T4 2.2, T3 1.85, ALT 70, AST 58, alkaline phosphatase 86.     Imagin2020: Ultrasound ()- Right lobe 3.71 x 5.08 x 7.25 cm, isthmus 0.84 cm, left lobe 2.00 x 2.00 x 4.27 cm.  Heterogeneous echotexture.  Normal blood flow.  No obvious nodules (study technically difficult due to body habitus and patient's inability to extend her neck).    2023: Ultrasound ([facility])- Right lobe 6.6 x 4.4 x 4.8 cm, isthmus 0.15 cm, left lobe 6.5 x 3.1 x 2.5 cm.  4.4 x 4.1 x 4.2 cm hyperechoic or isoechoic nodule with punctate echogenic foci at the right lower pole (TR 4).  1.6 x 1.5 x 1.6 cm hyperechoic or isoechoic nodule without punctate echogenic foci at the left lower pole (TR 3).    Biopsies:  2023: Fine-needle aspiration biopsy of 3.14 x 3.83 x 4.56 cm complex isoechoic nodule without calcifications or increased blood flow at the right lower pole (TR 3) (Clifford/Lorna)- cytology benign/Donnelly category II.        HYPERCALCEMIA  Rivka Clarke is here for follow-up of hypercalcemia.     Presentation/diagnosis: incidentally noted on labs 2020     Related comorbidies/clinical

## 2024-03-26 ENCOUNTER — TELEPHONE (OUTPATIENT)
Dept: ENDOCRINOLOGY | Age: 44
End: 2024-03-26

## 2024-03-26 NOTE — TELEPHONE ENCOUNTER
Pt called in asking if we could refill her blood pressure, vitamin d, and cholesterol medications. She is unable to get in with her PCP and does not come in to see  until late may.

## 2024-08-13 ENCOUNTER — OFFICE VISIT (OUTPATIENT)
Dept: ENDOCRINOLOGY | Age: 44
End: 2024-08-13
Payer: COMMERCIAL

## 2024-08-13 VITALS
DIASTOLIC BLOOD PRESSURE: 84 MMHG | HEART RATE: 88 BPM | SYSTOLIC BLOOD PRESSURE: 140 MMHG | BODY MASS INDEX: 46.07 KG/M2 | WEIGHT: 293 LBS

## 2024-08-13 DIAGNOSIS — E05.90 HYPERTHYROIDISM: Primary | ICD-10-CM

## 2024-08-13 DIAGNOSIS — Z86.39 HISTORY OF HYPERCALCEMIA: ICD-10-CM

## 2024-08-13 DIAGNOSIS — R74.01 ELEVATED LIVER TRANSAMINASE LEVEL: ICD-10-CM

## 2024-08-13 DIAGNOSIS — E55.9 VITAMIN D DEFICIENCY: ICD-10-CM

## 2024-08-13 DIAGNOSIS — F10.10 ALCOHOL ABUSE: ICD-10-CM

## 2024-08-13 DIAGNOSIS — E04.2 MULTINODULAR GOITER: ICD-10-CM

## 2024-08-13 DIAGNOSIS — E05.00 GRAVES' DISEASE: ICD-10-CM

## 2024-08-13 DIAGNOSIS — E05.90 HYPERTHYROIDISM: ICD-10-CM

## 2024-08-13 LAB
25(OH)D3 SERPL-MCNC: 41.9 NG/ML (ref 30–100)
ALBUMIN SERPL-MCNC: 4.3 G/DL (ref 3.5–5)
ALBUMIN/GLOB SERPL: 1.2 (ref 1–1.9)
ALP SERPL-CCNC: 102 U/L (ref 35–104)
ALT SERPL-CCNC: 76 U/L (ref 12–65)
ANION GAP SERPL CALC-SCNC: 12 MMOL/L (ref 9–18)
AST SERPL-CCNC: 82 U/L (ref 15–37)
BILIRUB SERPL-MCNC: 0.3 MG/DL (ref 0–1.2)
BUN SERPL-MCNC: 11 MG/DL (ref 6–23)
CALCIUM SERPL-MCNC: 9.5 MG/DL (ref 8.8–10.2)
CALCIUM SERPL-MCNC: 9.7 MG/DL (ref 8.8–10.2)
CHLORIDE SERPL-SCNC: 103 MMOL/L (ref 98–107)
CO2 SERPL-SCNC: 25 MMOL/L (ref 20–28)
CREAT SERPL-MCNC: 0.82 MG/DL (ref 0.6–1.1)
GLOBULIN SER CALC-MCNC: 3.5 G/DL (ref 2.3–3.5)
GLUCOSE SERPL-MCNC: 106 MG/DL (ref 70–99)
POTASSIUM SERPL-SCNC: 4 MMOL/L (ref 3.5–5.1)
PROT SERPL-MCNC: 7.8 G/DL (ref 6.3–8.2)
PTH-INTACT SERPL-MCNC: 37.9 PG/ML (ref 15–65)
SODIUM SERPL-SCNC: 140 MMOL/L (ref 136–145)
T3 SERPL-MCNC: 1.14 NG/ML (ref 0.6–1.81)
T4 FREE SERPL-MCNC: 1.5 NG/DL (ref 0.9–1.7)
TSH, 3RD GENERATION: 0.01 UIU/ML (ref 0.27–4.2)

## 2024-08-13 PROCEDURE — 3077F SYST BP >= 140 MM HG: CPT | Performed by: INTERNAL MEDICINE

## 2024-08-13 PROCEDURE — 3079F DIAST BP 80-89 MM HG: CPT | Performed by: INTERNAL MEDICINE

## 2024-08-13 PROCEDURE — 99214 OFFICE O/P EST MOD 30 MIN: CPT | Performed by: INTERNAL MEDICINE

## 2024-08-13 PROCEDURE — 76536 US EXAM OF HEAD AND NECK: CPT | Performed by: INTERNAL MEDICINE

## 2024-08-13 ASSESSMENT — ENCOUNTER SYMPTOMS
CONSTIPATION: 0
TROUBLE SWALLOWING: 0
DIARRHEA: 0
VOICE CHANGE: 1

## 2024-08-13 NOTE — PROGRESS NOTES
Fine-needle aspiration biopsy of 3.14 x 3.83 x 4.56 cm complex isoechoic nodule without calcifications or increased blood flow at the right lower pole (TR 3) (Clifford/Lorna)- cytology benign/Docena category II.        HYPERCALCEMIA  Rivka Clarke is here for follow-up of hypercalcemia.     Presentation/diagnosis: incidentally noted on labs June 2020     Related comorbidies/clinical features:   -Metabolic bone disease: none/unknown  -Fragility fractures: none  -Nephrolithiasis: none  -Renal dysfunction: none  -Thiazide diuretic use: none  -Calcium/vitamin D supplementation: Ergocalciferol 50,000 units weekly.     Bone densitometry/DXA:  No prior     Symptoms: See review of systems below     Labs:  Multiple normal calcium levels from 2010 to February 2020.  6/30/2020: Calcium 10.6, albumin 4.5.  7/7/2020: Calcium 10.8, intact parathyroid hormone 28, 25-hydroxy vitamin D 13.9, (1,25) dihydroxy vitamin D 37.0.  10/27/2020: Calcium 9.8, albumin 4.7, 25-hydroxy vitamin D 25.1.  11/18/2020: Calcium 10.1, phosphorus 3.9, albumin 4.7, intact parathyroid hormone 35, 25-hydroxy vitamin D 16.1.  1/14/2021: Calcium 9.6, albumin 4.6, creatinine 0.72.  2/2/2021: Calcium 9.6, creatinine 0.78.  8/29/2022: Calcium 9.4, creatinine 1.00.  9/1/2023: Calcium 9.5, creatinine 0.90.  9/28/2023: Calcium 9.5, creatinine 0.80.  2/22/2024: Calcium 10.5, creatinine 0.90.     Imaging: See ultrasounds below    Review of Systems   Constitutional:  Positive for fatigue (recently improved). Negative for unexpected weight change (intentionally lost 12 pounds in the last 11 months).   HENT:  Positive for voice change (occasional hoarseness). Negative for trouble swallowing.    Cardiovascular:  Negative for palpitations.   Gastrointestinal:  Negative for constipation and diarrhea.   Psychiatric/Behavioral:  Positive for sleep disturbance. Negative for dysphoric mood. The patient is nervous/anxious (she attributes to life stress).        BP (!)

## 2024-11-04 DIAGNOSIS — E05.90 HYPERTHYROIDISM: ICD-10-CM

## 2024-11-04 LAB
ALBUMIN SERPL-MCNC: 3.7 G/DL (ref 3.5–5)
ALBUMIN/GLOB SERPL: 1 (ref 1–1.9)
ALP SERPL-CCNC: 92 U/L (ref 35–104)
ALT SERPL-CCNC: 68 U/L (ref 8–45)
AST SERPL-CCNC: 49 U/L (ref 15–37)
BILIRUB DIRECT SERPL-MCNC: 0.2 MG/DL (ref 0–0.4)
BILIRUB SERPL-MCNC: 0.6 MG/DL (ref 0–1.2)
GLOBULIN SER CALC-MCNC: 3.7 G/DL (ref 2.3–3.5)
PROT SERPL-MCNC: 7.5 G/DL (ref 6.3–8.2)
T3 SERPL-MCNC: 1 NG/ML (ref 0.6–1.81)
T4 FREE SERPL-MCNC: 1.5 NG/DL (ref 0.9–1.7)
TSH, 3RD GENERATION: 0.15 UIU/ML (ref 0.27–4.2)

## 2025-02-19 ENCOUNTER — APPOINTMENT (OUTPATIENT)
Dept: CT IMAGING | Age: 45
End: 2025-02-19
Payer: COMMERCIAL

## 2025-02-19 ENCOUNTER — HOSPITAL ENCOUNTER (EMERGENCY)
Age: 45
Discharge: HOME | End: 2025-02-19
Payer: COMMERCIAL

## 2025-02-19 VITALS
RESPIRATION RATE: 16 BRPM | SYSTOLIC BLOOD PRESSURE: 172 MMHG | HEIGHT: 68 IN | WEIGHT: 290 LBS | OXYGEN SATURATION: 96 % | BODY MASS INDEX: 43.95 KG/M2 | TEMPERATURE: 97.9 F | HEART RATE: 93 BPM | DIASTOLIC BLOOD PRESSURE: 96 MMHG

## 2025-02-19 DIAGNOSIS — S39.012A STRAIN OF LUMBAR REGION, INITIAL ENCOUNTER: Primary | ICD-10-CM

## 2025-02-19 LAB
ALBUMIN SERPL-MCNC: 4.2 G/DL (ref 3.5–5)
ALBUMIN/GLOB SERPL: 1.1 (ref 1–1.9)
ALP SERPL-CCNC: 103 U/L (ref 35–104)
ALT SERPL-CCNC: 60 U/L (ref 8–45)
ANION GAP SERPL CALC-SCNC: 11 MMOL/L (ref 7–16)
AST SERPL-CCNC: 90 U/L (ref 15–37)
BACTERIA URNS QL MICRO: NEGATIVE /HPF
BASOPHILS # BLD: 0.02 K/UL (ref 0–0.2)
BASOPHILS NFR BLD: 0.4 % (ref 0–2)
BILIRUB SERPL-MCNC: 0.3 MG/DL (ref 0–1.2)
BILIRUB UR QL: NEGATIVE
BUN SERPL-MCNC: 12 MG/DL (ref 6–23)
CALCIUM SERPL-MCNC: 9.5 MG/DL (ref 8.8–10.2)
CHLORIDE SERPL-SCNC: 104 MMOL/L (ref 98–107)
CO2 SERPL-SCNC: 26 MMOL/L (ref 20–29)
CREAT SERPL-MCNC: 0.76 MG/DL (ref 0.6–1.1)
DIFFERENTIAL METHOD BLD: ABNORMAL
EOSINOPHIL # BLD: 0.07 K/UL (ref 0–0.8)
EOSINOPHIL NFR BLD: 1.4 % (ref 0.5–7.8)
EPI CELLS #/AREA URNS HPF: NORMAL /HPF
ERYTHROCYTE [DISTWIDTH] IN BLOOD BY AUTOMATED COUNT: 16.1 % (ref 11.9–14.6)
GLOBULIN SER CALC-MCNC: 3.9 G/DL (ref 2.3–3.5)
GLUCOSE SERPL-MCNC: 104 MG/DL (ref 70–99)
GLUCOSE UR QL STRIP.AUTO: NEGATIVE MG/DL
HCG UR QL: NEGATIVE
HCT VFR BLD AUTO: 39.5 % (ref 35.8–46.3)
HGB BLD-MCNC: 12.7 G/DL (ref 11.7–15.4)
HYALINE CASTS URNS QL MICRO: NORMAL /LPF
IMM GRANULOCYTES # BLD AUTO: 0.01 K/UL (ref 0–0.5)
IMM GRANULOCYTES NFR BLD AUTO: 0.2 % (ref 0–5)
KETONES UR-MCNC: NEGATIVE MG/DL
LEUKOCYTE ESTERASE UR QL STRIP: NEGATIVE
LIPASE SERPL-CCNC: 48 U/L (ref 13–60)
LYMPHOCYTES # BLD: 2.3 K/UL (ref 0.5–4.6)
LYMPHOCYTES NFR BLD: 45 % (ref 13–44)
MCH RBC QN AUTO: 30.4 PG (ref 26.1–32.9)
MCHC RBC AUTO-ENTMCNC: 32.2 G/DL (ref 31.4–35)
MCV RBC AUTO: 94.5 FL (ref 82–102)
MONOCYTES # BLD: 0.42 K/UL (ref 0.1–1.3)
MONOCYTES NFR BLD: 8.2 % (ref 4–12)
NEUTS SEG # BLD: 2.29 K/UL (ref 1.7–8.2)
NEUTS SEG NFR BLD: 44.8 % (ref 43–78)
NITRITE UR QL: NEGATIVE
NRBC # BLD: 0 K/UL (ref 0–0.2)
PH UR: 7 (ref 5–9)
PLATELET # BLD AUTO: 197 K/UL (ref 150–450)
PMV BLD AUTO: 10.4 FL (ref 9.4–12.3)
POTASSIUM SERPL-SCNC: 4 MMOL/L (ref 3.5–5.1)
PROT SERPL-MCNC: 8.1 G/DL (ref 6.3–8.2)
PROT UR QL: 30 MG/DL
RBC # BLD AUTO: 4.18 M/UL (ref 4.05–5.2)
RBC # UR STRIP: ABNORMAL
RBC #/AREA URNS HPF: NORMAL /HPF
SERVICE CMNT-IMP: ABNORMAL
SODIUM SERPL-SCNC: 141 MMOL/L (ref 136–145)
SP GR UR: 1.02 (ref 1–1.02)
UROBILINOGEN UR QL: 0.2 EU/DL (ref 0.2–1)
WBC # BLD AUTO: 5.1 K/UL (ref 4.3–11.1)
WBC URNS QL MICRO: NORMAL /HPF

## 2025-02-19 PROCEDURE — 81025 URINE PREGNANCY TEST: CPT

## 2025-02-19 PROCEDURE — 83690 ASSAY OF LIPASE: CPT

## 2025-02-19 PROCEDURE — 51798 US URINE CAPACITY MEASURE: CPT

## 2025-02-19 PROCEDURE — 74177 CT ABD & PELVIS W/CONTRAST: CPT

## 2025-02-19 PROCEDURE — 99285 EMERGENCY DEPT VISIT HI MDM: CPT

## 2025-02-19 PROCEDURE — 6360000004 HC RX CONTRAST MEDICATION: Performed by: INTERNAL MEDICINE

## 2025-02-19 PROCEDURE — 81001 URINALYSIS AUTO W/SCOPE: CPT

## 2025-02-19 PROCEDURE — 80053 COMPREHEN METABOLIC PANEL: CPT

## 2025-02-19 PROCEDURE — 85025 COMPLETE CBC W/AUTO DIFF WBC: CPT

## 2025-02-19 PROCEDURE — 96374 THER/PROPH/DIAG INJ IV PUSH: CPT

## 2025-02-19 PROCEDURE — 6360000002 HC RX W HCPCS

## 2025-02-19 RX ORDER — LIDOCAINE 4 G/G
1 PATCH TOPICAL DAILY
Qty: 30 PATCH | Refills: 0 | Status: SHIPPED | OUTPATIENT
Start: 2025-02-19 | End: 2025-03-21

## 2025-02-19 RX ORDER — KETOROLAC TROMETHAMINE 15 MG/ML
15 INJECTION, SOLUTION INTRAMUSCULAR; INTRAVENOUS ONCE
Status: COMPLETED | OUTPATIENT
Start: 2025-02-19 | End: 2025-02-19

## 2025-02-19 RX ORDER — IOPAMIDOL 755 MG/ML
100 INJECTION, SOLUTION INTRAVASCULAR
Status: COMPLETED | OUTPATIENT
Start: 2025-02-19 | End: 2025-02-19

## 2025-02-19 RX ADMIN — KETOROLAC TROMETHAMINE 15 MG: 15 INJECTION, SOLUTION INTRAMUSCULAR; INTRAVENOUS at 15:12

## 2025-02-19 RX ADMIN — IOPAMIDOL 100 ML: 755 INJECTION, SOLUTION INTRAVENOUS at 16:33

## 2025-02-19 ASSESSMENT — ENCOUNTER SYMPTOMS
NAUSEA: 0
DIARRHEA: 0
ABDOMINAL PAIN: 1
CONSTIPATION: 0
CHEST TIGHTNESS: 0
VOMITING: 0
SHORTNESS OF BREATH: 0

## 2025-02-19 ASSESSMENT — PAIN SCALES - GENERAL
PAINLEVEL_OUTOF10: 3
PAINLEVEL_OUTOF10: 7
PAINLEVEL_OUTOF10: 7

## 2025-02-19 ASSESSMENT — PAIN - FUNCTIONAL ASSESSMENT: PAIN_FUNCTIONAL_ASSESSMENT: 0-10

## 2025-02-19 NOTE — ED PROVIDER NOTES
Emergency Department Provider Note       PCP: Fuentes Britton Jr., MD   Age: 45 y.o.   Sex: female     DISPOSITION Decision To Discharge 02/19/2025 07:05:52 PM    ICD-10-CM    1. Strain of lumbar region, initial encounter  S39.012A           Medical Decision Making     45-year-old female presents with right flank and back pain.  She has recently been treated for urinary tract infection.  Urinalysis negative for infection.  Labs stable.  CT abdomen pelvis shows mild distention of her urinary bladder.  Mild thickening to gallbladder.  Offered patient ultrasound to further evaluate however she declined and will follow-up with her PCP for this.  Will obtain postvoid residual as she reports she may be retaining some urine.  Otherwise we will treat as musculoskeletal.  She has follow-up with her PCP next week.  Discussed return precautions..  ED Course as of 02/19/25 1922 Wed Feb 19, 2025   1803 CT abd/pelvis:  IMPRESSION:  Mild distention of the urinary bladder without surrounding  inflammatory changes. If clinical concern remains for acute gallbladder  pathology consider right upper quadrant ultrasound for further evaluation.     No other acute finding with particular attention to the kidneys.   [CJ]      ED Course User Index  [CJ] [place] Cosme, APRN - CNP     1 or more acute illnesses that pose a threat to life or bodily function.   Prescription drug management performed.  Patient was discharged risks and benefits of hospitalization were considered.  Shared medical decision making was utilized in creating the patients health plan today.  I independently ordered and reviewed each unique test.    I reviewed external records: ED visit note from a different ED.        I interpreted the CT Scan no acute abnormality.              History     45-year-old female with history of hypertension presents for right flank pain.  She reports been intermittent for the last 2 weeks.  She states that she was seen at an urgent

## 2025-02-19 NOTE — ED TRIAGE NOTES
Patient reports seeing urgent care on Sunday diagnosed with UTI and started on medication. Patient reports continued back pain, so much so that she stopped taking her other medications for her blood pressure.

## 2025-02-20 NOTE — DISCHARGE INSTRUCTIONS
Use lidocaine patch on 12 hours off 12 hours.  Use Tylenol or ibuprofen for pain.  Follow-up with your PCP.  Please discuss ultrasound of your right upper quadrant to further evaluate your gallbladder if you continue to have pain.  Return to the emergency department with any worsening pain or concerns.

## 2025-03-03 ENCOUNTER — OFFICE VISIT (OUTPATIENT)
Dept: ENDOCRINOLOGY | Age: 45
End: 2025-03-03
Payer: COMMERCIAL

## 2025-03-03 VITALS
HEART RATE: 98 BPM | OXYGEN SATURATION: 98 % | DIASTOLIC BLOOD PRESSURE: 89 MMHG | WEIGHT: 293 LBS | HEIGHT: 68 IN | SYSTOLIC BLOOD PRESSURE: 140 MMHG | BODY MASS INDEX: 44.41 KG/M2

## 2025-03-03 DIAGNOSIS — E05.00 GRAVES' DISEASE: ICD-10-CM

## 2025-03-03 DIAGNOSIS — E05.90 HYPERTHYROIDISM: ICD-10-CM

## 2025-03-03 DIAGNOSIS — E55.9 VITAMIN D DEFICIENCY: ICD-10-CM

## 2025-03-03 DIAGNOSIS — E04.2 MULTINODULAR GOITER: ICD-10-CM

## 2025-03-03 DIAGNOSIS — Z86.39 HISTORY OF HYPERCALCEMIA: ICD-10-CM

## 2025-03-03 DIAGNOSIS — R74.01 ELEVATED LIVER TRANSAMINASE LEVEL: ICD-10-CM

## 2025-03-03 DIAGNOSIS — E05.90 HYPERTHYROIDISM: Primary | ICD-10-CM

## 2025-03-03 LAB
25(OH)D3 SERPL-MCNC: 25.9 NG/ML (ref 30–100)
ALBUMIN SERPL-MCNC: 4.1 G/DL (ref 3.5–5)
ALBUMIN/GLOB SERPL: 1.1 (ref 1–1.9)
ALP SERPL-CCNC: 102 U/L (ref 35–104)
ALT SERPL-CCNC: 79 U/L (ref 8–45)
ANION GAP SERPL CALC-SCNC: 13 MMOL/L (ref 7–16)
AST SERPL-CCNC: 100 U/L (ref 15–37)
BILIRUB SERPL-MCNC: 0.5 MG/DL (ref 0–1.2)
BUN SERPL-MCNC: 10 MG/DL (ref 6–23)
CALCIUM SERPL-MCNC: 9.5 MG/DL (ref 8.8–10.2)
CALCIUM SERPL-MCNC: 9.6 MG/DL (ref 8.8–10.2)
CHLORIDE SERPL-SCNC: 106 MMOL/L (ref 98–107)
CO2 SERPL-SCNC: 26 MMOL/L (ref 20–29)
CREAT SERPL-MCNC: 0.8 MG/DL (ref 0.6–1.1)
GLOBULIN SER CALC-MCNC: 3.9 G/DL (ref 2.3–3.5)
GLUCOSE SERPL-MCNC: 109 MG/DL (ref 70–99)
POTASSIUM SERPL-SCNC: 4 MMOL/L (ref 3.5–5.1)
PROT SERPL-MCNC: 8 G/DL (ref 6.3–8.2)
PTH-INTACT SERPL-MCNC: 69.9 PG/ML (ref 15–65)
SODIUM SERPL-SCNC: 144 MMOL/L (ref 136–145)
T3 SERPL-MCNC: 1.3 NG/ML (ref 0.6–1.81)
T4 FREE SERPL-MCNC: 1.6 NG/DL (ref 0.9–1.7)
TSH, 3RD GENERATION: 0.01 UIU/ML (ref 0.27–4.2)

## 2025-03-03 PROCEDURE — 3077F SYST BP >= 140 MM HG: CPT | Performed by: INTERNAL MEDICINE

## 2025-03-03 PROCEDURE — 3079F DIAST BP 80-89 MM HG: CPT | Performed by: INTERNAL MEDICINE

## 2025-03-03 PROCEDURE — 99214 OFFICE O/P EST MOD 30 MIN: CPT | Performed by: INTERNAL MEDICINE

## 2025-03-03 ASSESSMENT — ENCOUNTER SYMPTOMS
VOICE CHANGE: 1
TROUBLE SWALLOWING: 0
CONSTIPATION: 0
DIARRHEA: 0

## 2025-03-03 NOTE — PROGRESS NOTES
25 Hydroxy     Standing Status:   Future     Number of Occurrences:   1     Standing Expiration Date:   3/3/2026    PTH, Intact     Standing Status:   Future     Number of Occurrences:   1     Standing Expiration Date:   3/3/2026    TSH     Standing Status:   Future     Standing Expiration Date:   3/3/2026    T4, Free     Standing Status:   Future     Standing Expiration Date:   3/3/2026    T3     Standing Status:   Future     Standing Expiration Date:   3/3/2026    Comprehensive Metabolic Panel     Standing Status:   Future     Standing Expiration Date:   3/3/2026    Vitamin D 25 Hydroxy     Standing Status:   Future     Standing Expiration Date:   3/3/2026         Current Outpatient Medications   Medication Sig Dispense Refill    lidocaine 4 % external patch Place 1 patch onto the skin daily 30 patch 0    metoprolol succinate (TOPROL XL) 50 MG extended release tablet Take 1 tablet by mouth daily 90 tablet 3    lisinopril (PRINIVIL;ZESTRIL) 40 MG tablet Take 1 tablet by mouth daily      lovastatin (MEVACOR) 40 MG tablet Take 1 tablet by mouth      hydroCHLOROthiazide (HYDRODIURIL) 25 MG tablet Take 1 tablet by mouth daily Doesn't take everyday 30 tablet 11    vitamin D (CHOLECALCIFEROL) 72207 UNIT CAPS Take 1 capsule by mouth once a week (Patient not taking: Reported on 3/3/2025) 13 capsule 3    meloxicam (MOBIC) 15 MG tablet as needed TAKE 1 TABLET BY MOUTH EVERY DAY (Patient not taking: Reported on 8/13/2024)       No current facility-administered medications for this visit.       HUANG Horton MD, FACE      Portions of this note were generated with the assistance of voice recognition software.  As such, some errors in transcription may be present.

## 2025-05-21 ENCOUNTER — PATIENT MESSAGE (OUTPATIENT)
Dept: ENDOCRINOLOGY | Age: 45
End: 2025-05-21

## 2025-07-14 ENCOUNTER — TELEPHONE (OUTPATIENT)
Dept: ENDOCRINOLOGY | Age: 45
End: 2025-07-14

## 2025-07-14 NOTE — TELEPHONE ENCOUNTER
[facility] asking if  had reviewed or recd thyroid labs sent over for pt.    Spoke to Marium to let her know that he has been out of the office and I am not able to tell if the fax was received during that time or not; she will re-send just in case.  Confirmed fax #.